# Patient Record
Sex: FEMALE | Race: WHITE | NOT HISPANIC OR LATINO | Employment: OTHER | URBAN - NONMETROPOLITAN AREA
[De-identification: names, ages, dates, MRNs, and addresses within clinical notes are randomized per-mention and may not be internally consistent; named-entity substitution may affect disease eponyms.]

---

## 2017-05-02 ENCOUNTER — TELEPHONE (OUTPATIENT)
Dept: FAMILY MEDICINE | Facility: OTHER | Age: 57
End: 2017-05-02

## 2017-05-02 DIAGNOSIS — Z12.11 ENCOUNTER FOR SCREENING FECAL OCCULT BLOOD TESTING: Primary | ICD-10-CM

## 2017-05-02 NOTE — TELEPHONE ENCOUNTER
5/2/2017    Called the patient for Health Maintenance, agreed to FIT test screening. Order placed for provider to sign.      Please does not wish to complete colonoscopy, however, patient has agreed to FIT test. Please send the FIT test to the patient's home.         Thanks!  Outreach ,  Mignon Lang

## 2017-11-13 DIAGNOSIS — E78.5 HYPERLIPIDEMIA WITH TARGET LDL LESS THAN 130: ICD-10-CM

## 2017-11-14 RX ORDER — SIMVASTATIN 20 MG
TABLET ORAL
Qty: 30 TABLET | Refills: 0 | Status: SHIPPED | OUTPATIENT
Start: 2017-11-14 | End: 2017-11-29

## 2017-11-14 NOTE — TELEPHONE ENCOUNTER
Requested Prescriptions   Pending Prescriptions Disp Refills     simvastatin (ZOCOR) 20 MG tablet [Pharmacy Med Name: SIMVASTATIN 20MG TABS] 90 tablet 3     Sig: TAKE ONE TABLET BY MOUTH AT BEDTIME    Statins Protocol Passed    11/13/2017  2:24 PM       Passed - LDL on file in past 12 months    Recent Labs   Lab Test  11/16/16   0846   LDL  79            Passed - No abnormal creatine kinase in past 12 months    No lab results found.         Passed - Recent or future visit with authorizing provider    Patient had office visit in the last year or has a visit in the next 30 days with authorizing provider.  See chart review.              Passed - Patient is age 18 or older       Passed - No active pregnancy on record       Passed - No positive pregnancy test in past 12 months

## 2017-11-14 NOTE — TELEPHONE ENCOUNTER
Medication is being filled for 1 time refill only due to:  Patient needs to be seen because it has been more than one year since last visit.     Will route to scheduling to call and schedule.     Tiffany Malik RN  [facility]

## 2017-11-29 ENCOUNTER — OFFICE VISIT (OUTPATIENT)
Dept: FAMILY MEDICINE | Facility: OTHER | Age: 57
End: 2017-11-29
Payer: COMMERCIAL

## 2017-11-29 VITALS
DIASTOLIC BLOOD PRESSURE: 80 MMHG | WEIGHT: 169 LBS | SYSTOLIC BLOOD PRESSURE: 130 MMHG | OXYGEN SATURATION: 96 % | HEART RATE: 84 BPM | BODY MASS INDEX: 31.1 KG/M2 | TEMPERATURE: 97.7 F | RESPIRATION RATE: 20 BRPM | HEIGHT: 62 IN

## 2017-11-29 DIAGNOSIS — Z11.59 NEED FOR HEPATITIS C SCREENING TEST: ICD-10-CM

## 2017-11-29 DIAGNOSIS — Z00.00 ROUTINE GENERAL MEDICAL EXAMINATION AT A HEALTH CARE FACILITY: Primary | ICD-10-CM

## 2017-11-29 DIAGNOSIS — Z12.11 SPECIAL SCREENING FOR MALIGNANT NEOPLASMS, COLON: ICD-10-CM

## 2017-11-29 DIAGNOSIS — R79.89 ELEVATED TSH: ICD-10-CM

## 2017-11-29 DIAGNOSIS — Z12.31 ENCOUNTER FOR SCREENING MAMMOGRAM FOR BREAST CANCER: ICD-10-CM

## 2017-11-29 DIAGNOSIS — Z13.1 SCREENING FOR DIABETES MELLITUS: ICD-10-CM

## 2017-11-29 DIAGNOSIS — E78.5 HYPERLIPIDEMIA WITH TARGET LDL LESS THAN 130: ICD-10-CM

## 2017-11-29 LAB
CHOLEST SERPL-MCNC: 182 MG/DL
GLUCOSE SERPL-MCNC: 105 MG/DL (ref 70–99)
HCV AB SERPL QL IA: NONREACTIVE
HDLC SERPL-MCNC: 50 MG/DL
LDLC SERPL CALC-MCNC: 99 MG/DL
NONHDLC SERPL-MCNC: 132 MG/DL
TRIGL SERPL-MCNC: 165 MG/DL
TSH SERPL DL<=0.005 MIU/L-ACNC: 2.98 MU/L (ref 0.4–4)

## 2017-11-29 PROCEDURE — 86803 HEPATITIS C AB TEST: CPT | Performed by: NURSE PRACTITIONER

## 2017-11-29 PROCEDURE — 36415 COLL VENOUS BLD VENIPUNCTURE: CPT | Performed by: NURSE PRACTITIONER

## 2017-11-29 PROCEDURE — 84443 ASSAY THYROID STIM HORMONE: CPT | Performed by: NURSE PRACTITIONER

## 2017-11-29 PROCEDURE — 99396 PREV VISIT EST AGE 40-64: CPT | Performed by: NURSE PRACTITIONER

## 2017-11-29 PROCEDURE — 80061 LIPID PANEL: CPT | Performed by: NURSE PRACTITIONER

## 2017-11-29 PROCEDURE — 82947 ASSAY GLUCOSE BLOOD QUANT: CPT | Performed by: NURSE PRACTITIONER

## 2017-11-29 RX ORDER — SIMVASTATIN 20 MG
TABLET ORAL
Qty: 90 TABLET | Refills: 3 | Status: SHIPPED | OUTPATIENT
Start: 2017-11-29 | End: 2018-12-13

## 2017-11-29 NOTE — NURSING NOTE
"Chief Complaint   Patient presents with     Physical     no pap     Health Maintenance     colon, hep c, mammo, declines flu shot       Initial /80  Pulse 84  Temp 97.7  F (36.5  C) (Tympanic)  Resp 20  Ht 5' 2\" (1.575 m)  Wt 169 lb (76.7 kg)  LMP 04/14/2009  SpO2 96%  Breastfeeding? No  BMI 30.91 kg/m2 Estimated body mass index is 30.91 kg/(m^2) as calculated from the following:    Height as of this encounter: 5' 2\" (1.575 m).    Weight as of this encounter: 169 lb (76.7 kg).  Medication Reconciliation: complete   ................Tha Camacho LPN,   November 29, 2017,      8:20 AM,   [facility]    "

## 2017-11-29 NOTE — MR AVS SNAPSHOT
After Visit Summary   11/29/2017    Sandy Burleson    MRN: [id number]           Patient Information     Date Of Birth          1960        Visit Information        Provider Department      11/29/2017 8:00 AM Mily Dale APRN [facility]        Today's Diagnoses     Routine general medical examination at a health care facility    -  1    Hyperlipidemia with target LDL less than 130        Encounter for screening mammogram for breast cancer        Special screening for malignant neoplasms, colon        Screening for diabetes mellitus        Elevated TSH        Need for hepatitis C screening test          Care Instructions    Labs will be done today.  I will call or send a letter with results within the next 1-2 weeks.      Schedule a mammogram.         Preventive Health Recommendations  Female Ages 50 - 64    Yearly exam: See your health care provider every year in order to  o Review health changes.   o Discuss preventive care.    o Review your medicines if your doctor has prescribed any.      Get a Pap test every three years (unless you have an abnormal result and your provider advises testing more often).    If you get Pap tests with HPV test, you only need to test every 5 years, unless you have an abnormal result.     You do not need a Pap test if your uterus was removed (hysterectomy) and you have not had cancer.    You should be tested each year for STDs (sexually transmitted diseases) if you're at risk.     Have a mammogram every 1 to 2 years.    Have a colonoscopy at age 50, or have a yearly FIT test (stool test). These exams screen for colon cancer.      Have a cholesterol test every 5 years, or more often if advised.    Have a diabetes test (fasting glucose) every three years. If you are at risk for diabetes, you should have this test more often.     If you are at risk for osteoporosis (brittle bone disease), think about having a bone density scan (DEXA).    Shots: Get  "a flu shot each year. Get a tetanus shot every 10 years.    Nutrition:     Eat at least 5 servings of fruits and vegetables each day.    Eat whole-grain bread, whole-wheat pasta and brown rice instead of white grains and rice.    Talk to your provider about Calcium and Vitamin D.     Lifestyle    Exercise at least 150 minutes a week (30 minutes a day, 5 days a week). This will help you control your weight and prevent disease.    Limit alcohol to one drink per day.    No smoking.     Wear sunscreen to prevent skin cancer.     See your dentist every six months for an exam and cleaning.    See your eye doctor every 1 to 2 years.            Follow-ups after your visit        Future tests that were ordered for you today     Open Future Orders        Priority Expected Expires Ordered    *MA Screening Digital Bilateral Routine  11/29/2018 11/29/2017    Fecal colorectal cancer screen (FIT) Routine 12/20/2017 2/21/2018 11/29/2017            Who to contact     If you have questions or need follow up information about today's clinic visit or your schedule please contact [facility] directly at [phone].  Normal or non-critical lab and imaging results will be communicated to you by Green Ahart, letter or phone within 4 business days after the clinic has received the results. If you do not hear from us within 7 days, please contact the clinic through [employer] or phone. If you have a critical or abnormal lab result, we will notify you by phone as soon as possible.  Submit refill requests through Thu[employer] or call your pharmacy and they will forward the refill request to us. Please allow 3 business days for your refill to be completed.          Additional Information About Your Visit        Green Ahart Information     Thucy lets you send messages to your doctor, view your test results, renew your prescriptions, schedule appointments and more. To sign up, go to [email or web address] . Click on \"Log in\" on the left side of " "the screen, which will take you to the Welcome page. Then click on \"Sign up Now\" on the right side of the page.     You will be asked to enter the access code listed below, as well as some personal information. Please follow the directions to create your username and password.     Your access code is: [email or web address]  Expires: 2018  8:34 AM     Your access code will  in 90 days. If you need help or a new code, please call your [facility] clinic or [phone].        Care EveryWhere ID     This is your Care EveryWhere ID. This could be used by other organizations to access your [facility] medical records  [id number]        Your Vitals Were     Pulse Temperature Respirations Height Last Period Pulse Oximetry    84 97.7  F (36.5  C) (Tympanic) 20 5' 2\" (1.575 m) 2009 96%    Breastfeeding? BMI (Body Mass Index)                No 30.91 kg/m2           Blood Pressure from Last 3 Encounters:   17 130/80   16 134/72   16 132/80    Weight from Last 3 Encounters:   17 169 lb (76.7 kg)   16 170 lb 11.2 oz (77.4 kg)   16 173 lb 1.6 oz (78.5 kg)              We Performed the Following     Glucose     Hepatitis C Screen Reflex to HCV RNA Quant and Genotype     Lipid panel reflex to direct LDL Fasting     TSH with free T4 reflex          Today's Medication Changes          These changes are accurate as of: 17  8:34 AM.  If you have any questions, ask your nurse or doctor.               These medicines have changed or have updated prescriptions.        Dose/Directions    simvastatin 20 MG tablet   Commonly known as:  ZOCOR   This may have changed:  See the new instructions.   Used for:  Hyperlipidemia with target LDL less than 130   Changed by:  Mily Dale APRN CNP        TAKE ONE TABLET BY MOUTH AT BEDTIME   Quantity:  90 tablet   Refills:  3            Where to get your medicines      These medications were sent to [facility] [place] - [address]  [address], [place] [address]     Phone:  [phone]     simvastatin 20 MG tablet                Primary Care Provider Office Phone # Fax #    GLADYS Gallo [phone]       [address] [facility] [address]        Equal Access to Services     BURTON GUEVARA : Hadii aad ku hadasho Soomaali, waaxda luqadaha, qaybta kaalmada adeegyada, waxay jeffyin haysusann tushar spencerlubanik norris. So [facility] [phone].    ATENCIÓN: Si habla español, tiene a abrams disposición servicios gratuitos de asistencia lingüística. Elena al [phone].    We comply with applicable federal civil rights laws and [place] laws. We do not discriminate on the basis of race, color, national origin, age, disability, sex, sexual orientation, or gender identity.            Thank you!     Thank you for choosing [facility]  for your care. Our goal is always to provide you with excellent care. Hearing back from our patients is one way we can continue to improve our services. Please take a few minutes to complete the written survey that you may receive in the mail after your visit with us. Thank you!             Your Updated Medication List - Protect others around you: Learn how to safely use, store and throw away your medicines at www.disposemymeds.org.          This list is accurate as of: 11/29/17  8:34 AM.  Always use your most recent med list.                   Brand Name Dispense Instructions for use Diagnosis    EPINEPHrine 0.3 MG/0.3ML injection 2-pack    EPIPEN 2-XIMENA    1 each    Inject 0.3 mLs (0.3 mg) into the muscle once as needed for anaphylaxis    Allergy, insect bite       MULTIVITAMIN TABS   OR      1 tab daily        priLOSEC 20 MG CR capsule   Generic drug:  omeprazole     90    1 TAB PO QD (Once per day)        simvastatin 20 MG tablet    ZOCOR    90 tablet    TAKE ONE TABLET BY MOUTH AT BEDTIME    Hyperlipidemia with target LDL less than 130

## 2017-11-29 NOTE — PROGRESS NOTES
SUBJECTIVE:   CC: Sandy Burleson is an 57 year old woman who presents for preventive health visit.     Physical   Annual:     Getting at least 3 servings of Calcium per day::  Yes    Bi-annual eye exam::  NO    Dental care twice a year::  Yes    Sleep apnea or symptoms of sleep apnea::  None    Diet::  Regular (no restrictions)    Frequency of exercise::  2-3 days/week    Duration of exercise::  15-30 minutes    Taking medications regularly::  Yes    Medication side effects::  None and No muscle aches    Additional concerns today::  No        Today's PHQ-2 Score: PHQ-2 ( 1999 Pfizer) 11/29/2017   Q1: Little interest or pleasure in doing things 0   Q2: Feeling down, depressed or hopeless 0   PHQ-2 Score 0   Q1: Little interest or pleasure in doing things Not at all   Q2: Feeling down, depressed or hopeless Not at all   PHQ-2 Score 0       Abuse: Current or Past(Physical, Sexual or Emotional)- No  Do you feel safe in your environment - Yes    Social History   Substance Use Topics     Smoking status: Never Smoker     Smokeless tobacco: Never Used     Alcohol use No     The patient does not drink >3 drinks per day nor >7 drinks per week.    Reviewed orders with patient.  Reviewed health maintenance and updated orders accordingly - Yes  Labs reviewed in EPIC  BP Readings from Last 3 Encounters:   11/29/17 130/80   11/16/16 134/72   01/27/16 132/80    Wt Readings from Last 3 Encounters:   11/29/17 169 lb (76.7 kg)   11/16/16 170 lb 11.2 oz (77.4 kg)   01/27/16 173 lb 1.6 oz (78.5 kg)                  Patient Active Problem List   Diagnosis     Hyperlipidemia with target LDL less than 130     Advanced directives, counseling/discussion     Past Surgical History:   Procedure Laterality Date     C APPENDECTOMY  age 12     HC LAPAROSCOPY, SURGICAL; CHOLECYSTECTOMY      Cholecystectomy, Laparoscopic     HC REMOVAL OF TONSILS,<13 Y/O  age 6     HYSTERECTOMY, PAP NO LONGER INDICATED       HYSTERECTOMY, VAGINAL  6/1/09        Social History   Substance Use Topics     Smoking status: Never Smoker     Smokeless tobacco: Never Used     Alcohol use No     Family History   Problem Relation Age of Onset     Arthritis Mother      DIABETES Paternal Grandmother      ?     HEART DISEASE Maternal Grandfather       from MI ? at age 76         Current Outpatient Prescriptions   Medication Sig Dispense Refill     simvastatin (ZOCOR) 20 MG tablet TAKE ONE TABLET BY MOUTH AT BEDTIME 90 tablet 3     PRILOSEC 20 MG OR CPDR 1 TAB PO QD (Once per day) 90 3     MULTIVITAMIN TABS   OR 1 tab daily       [DISCONTINUED] simvastatin (ZOCOR) 20 MG tablet TAKE ONE TABLET BY MOUTH AT BEDTIME 30 tablet 0     EPINEPHrine (EPIPEN 2-XIMENA) 0.3 MG/0.3ML injection Inject 0.3 mLs (0.3 mg) into the muscle once as needed for anaphylaxis 1 each 2     Allergies   Allergen Reactions     Bee Venom      swells up/respiratory difficulty     Oxycodone-Acetaminophen      Percocet-Itching     Sulfa Drugs      rash around ankles           Patient over age 50, mutual decision to screen reflected in health maintenance.      Pertinent mammograms are reviewed under the imaging tab.  History of abnormal Pap smear: Status post benign hysterectomy. Health Maintenance and Surgical History updated.    Reviewed and updated as needed this visit by clinical staffTobacco  Allergies  Meds  Soc Hx        Reviewed and updated as needed this visit by Provider        Past Medical History:   Diagnosis Date     Acute gastric ulcer without mention of hemorrhage, perforation, or obstruction 4 years ago    no current sx, had endoscopy, takes prilosec     Anemia     SECONDARY TO MENORRHAGIA      Past Surgical History:   Procedure Laterality Date     C APPENDECTOMY  age 12     HC LAPAROSCOPY, SURGICAL; CHOLECYSTECTOMY      Cholecystectomy, Laparoscopic     HC REMOVAL OF TONSILS,<11 Y/O  age 6     HYSTERECTOMY, PAP NO LONGER INDICATED       HYSTERECTOMY, VAGINAL  09       Review of Systems  C:  "NEGATIVE for fever, chills, change in weight  I: NEGATIVE for worrisome rashes, moles or lesions  E: NEGATIVE for vision changes or irritation  ENT: NEGATIVE for ear, mouth and throat problems  R: NEGATIVE for significant cough or SOB  B: NEGATIVE for masses, tenderness or discharge  CV: NEGATIVE for chest pain, palpitations or peripheral edema  GI: NEGATIVE for nausea, abdominal pain, heartburn, or change in bowel habits  : NEGATIVE for unusual urinary or vaginal symptoms. No vaginal bleeding.  M: NEGATIVE for significant arthralgias or myalgia  N: NEGATIVE for weakness, dizziness or paresthesias  P: NEGATIVE for changes in mood or affect      OBJECTIVE:   /80  Pulse 84  Temp 97.7  F (36.5  C) (Tympanic)  Resp 20  Ht 5' 2\" (1.575 m)  Wt 169 lb (76.7 kg)  LMP 04/14/2009  SpO2 96%  Breastfeeding? No  BMI 30.91 kg/m2  Physical Exam  GENERAL APPEARANCE: alert, no distress and over weight  EYES: Eyes grossly normal to inspection, PERRL and conjunctivae and sclerae normal  HENT: ear canals and TM's normal, nose and mouth without ulcers or lesions, oropharynx clear and oral mucous membranes moist  NECK: no adenopathy, no asymmetry, masses, or scars and thyroid normal to palpation  RESP: lungs clear to auscultation - no rales, rhonchi or wheezes  CV: regular rate and rhythm, normal S1 S2, no S3 or S4, no murmur, click or rub, no peripheral edema and peripheral pulses strong  ABDOMEN: soft, nontender, no hepatosplenomegaly, no masses and bowel sounds normal  MS: no musculoskeletal defects are noted and gait is age appropriate without ataxia  SKIN: no suspicious lesions or rashes  NEURO: Normal strength and tone, sensory exam grossly normal, mentation intact and speech normal  PSYCH: mentation appears normal and affect normal/bright    ASSESSMENT/PLAN:   1. Routine general medical examination at a health care facility    2. Hyperlipidemia with target LDL less than 130  Chronic, controlled.  No change in " "treatment plan.   - simvastatin (ZOCOR) 20 MG tablet; TAKE ONE TABLET BY MOUTH AT BEDTIME  Dispense: 90 tablet; Refill: 3  - Lipid panel reflex to direct LDL Fasting    3. Encounter for screening mammogram for breast cancer  - *MA Screening Digital Bilateral; Future    4. Special screening for malignant neoplasms, colon  - Fecal colorectal cancer screen (FIT); Future    5. Screening for diabetes mellitus  - Glucose    6. Elevated TSH  Elevated last year, will repeat today.  No symptoms.   - TSH with free T4 reflex    7. Need for hepatitis C screening test  - Hepatitis C Screen Reflex to HCV RNA Quant and Genotype    COUNSELING:  Reviewed preventive health counseling, as reflected in patient instructions       Regular exercise       Healthy diet/nutrition       Colon cancer screening       Consider Hep C screening for patients born between 1945 and 1965    BP Screening:   Last 3 BP Readings:    BP Readings from Last 3 Encounters:   11/29/17 130/80   11/16/16 134/72   01/27/16 132/80       The following was recommended to the patient:  Re-screen BP within a year and recommended lifestyle modifications     reports that she has never smoked. She has never used smokeless tobacco.    Estimated body mass index is 30.91 kg/(m^2) as calculated from the following:    Height as of this encounter: 5' 2\" (1.575 m).    Weight as of this encounter: 169 lb (76.7 kg).   Weight management plan: Discussed healthy diet and exercise guidelines and patient will follow up in 12 months in clinic to re-evaluate.    Counseling Resources:  ATP IV Guidelines  Pooled Cohorts Equation Calculator  Breast Cancer Risk Calculator  FRAX Risk Assessment  ICSI Preventive Guidelines  Dietary Guidelines for Americans, 2010  USDA's MyPlate  ASA Prophylaxis  Lung CA Screening    GLADYS Gallo CNP  [facility]"

## 2017-11-29 NOTE — PATIENT INSTRUCTIONS
Labs will be done today.  I will call or send a letter with results within the next 1-2 weeks.      Schedule a mammogram.         Preventive Health Recommendations  Female Ages 50 - 64    Yearly exam: See your health care provider every year in order to  o Review health changes.   o Discuss preventive care.    o Review your medicines if your doctor has prescribed any.      Get a Pap test every three years (unless you have an abnormal result and your provider advises testing more often).    If you get Pap tests with HPV test, you only need to test every 5 years, unless you have an abnormal result.     You do not need a Pap test if your uterus was removed (hysterectomy) and you have not had cancer.    You should be tested each year for STDs (sexually transmitted diseases) if you're at risk.     Have a mammogram every 1 to 2 years.    Have a colonoscopy at age 50, or have a yearly FIT test (stool test). These exams screen for colon cancer.      Have a cholesterol test every 5 years, or more often if advised.    Have a diabetes test (fasting glucose) every three years. If you are at risk for diabetes, you should have this test more often.     If you are at risk for osteoporosis (brittle bone disease), think about having a bone density scan (DEXA).    Shots: Get a flu shot each year. Get a tetanus shot every 10 years.    Nutrition:     Eat at least 5 servings of fruits and vegetables each day.    Eat whole-grain bread, whole-wheat pasta and brown rice instead of white grains and rice.    Talk to your provider about Calcium and Vitamin D.     Lifestyle    Exercise at least 150 minutes a week (30 minutes a day, 5 days a week). This will help you control your weight and prevent disease.    Limit alcohol to one drink per day.    No smoking.     Wear sunscreen to prevent skin cancer.     See your dentist every six months for an exam and cleaning.    See your eye doctor every 1 to 2 years.

## 2017-11-30 NOTE — PROGRESS NOTES
Letter sent to patient informing of lab results/recommendations.  ................Tha Camacho LPN,   November 30, 2017,      10:33 AM,   [facility]

## 2017-12-05 ENCOUNTER — RADIANT APPOINTMENT (OUTPATIENT)
Dept: MAMMOGRAPHY | Facility: OTHER | Age: 57
End: 2017-12-05
Attending: NURSE PRACTITIONER
Payer: COMMERCIAL

## 2017-12-05 DIAGNOSIS — Z12.31 ENCOUNTER FOR SCREENING MAMMOGRAM FOR BREAST CANCER: ICD-10-CM

## 2017-12-05 PROCEDURE — G0202 SCR MAMMO BI INCL CAD: HCPCS | Mod: TC

## 2018-09-25 DIAGNOSIS — E78.5 HYPERLIPIDEMIA WITH TARGET LDL LESS THAN 130: ICD-10-CM

## 2018-09-25 NOTE — TELEPHONE ENCOUNTER
"Requested Prescriptions   Pending Prescriptions Disp Refills     simvastatin (ZOCOR) 20 MG tablet [Pharmacy Med Name: SIMVASTATIN 20MG TABS] 90 tablet 3    Last Written Prescription Date:  11/29/17  Last Fill Quantity: 90,  # refills: 3   Last office visit: 11/29/2017 with prescribing provider:  11/29/17   Future Office Visit:     Sig: TAKE ONE TABLET BY MOUTH AT BEDTIME    Statins Protocol Passed    9/25/2018  2:50 PM       Passed - LDL on file in past 12 months    Recent Labs   Lab Test  11/29/17   0834   LDL  99            Passed - No abnormal creatine kinase in past 12 months    No lab results found.            Passed - Recent (12 mo) or future (30 days) visit within the authorizing provider's specialty    Patient had office visit in the last 12 months or has a visit in the next 30 days with authorizing provider or within the authorizing provider's specialty.  See \"Patient Info\" tab in inbasket, or \"Choose Columns\" in Meds & Orders section of the refill encounter.           Passed - Patient is age 18 or older       Passed - No active pregnancy on record       Passed - No positive pregnancy test in past 12 months          "

## 2018-09-26 RX ORDER — SIMVASTATIN 20 MG
TABLET ORAL
Qty: 90 TABLET | Refills: 3 | OUTPATIENT
Start: 2018-09-26

## 2018-09-26 NOTE — TELEPHONE ENCOUNTER
Prescription was sent 11/29/17 for #90 with 3 refills.  Pharmacy notified via E-Prescribe refusal.     Tiffany Malik RN  [facility]

## 2018-12-13 DIAGNOSIS — E78.5 HYPERLIPIDEMIA WITH TARGET LDL LESS THAN 130: ICD-10-CM

## 2018-12-14 RX ORDER — SIMVASTATIN 20 MG
TABLET ORAL
Qty: 30 TABLET | Refills: 0 | Status: SHIPPED | OUTPATIENT
Start: 2018-12-14 | End: 2019-01-29

## 2018-12-14 NOTE — TELEPHONE ENCOUNTER
"Stephanie refill given per RN protocol.   Please contact patient to have them schedule the following: due for annual exam and labs after 11/29/2018    Catherine Lucas RN, BSN      zocor  Last Written Prescription Date:  11/29/2017  Last Fill Quantity: 90,  # refills: 3   Last office visit: 11/29/2017 with prescribing provider:  11/29/2017   Future Office Visit:      Requested Prescriptions   Pending Prescriptions Disp Refills     simvastatin (ZOCOR) 20 MG tablet [Pharmacy Med Name: SIMVASTATIN 20MG TABS] 90 tablet 3     Sig: TAKE ONE TABLET BY MOUTH AT BEDTIME    Statins Protocol Failed - 12/13/2018  1:06 AM       Failed - LDL on file in past 12 months    Recent Labs   Lab Test 11/29/17  0834   LDL 99            Failed - Recent (12 mo) or future (30 days) visit within the authorizing provider's specialty    Patient had office visit in the last 12 months or has a visit in the next 30 days with authorizing provider or within the authorizing provider's specialty.  See \"Patient Info\" tab in inbasket, or \"Choose Columns\" in Meds & Orders section of the refill encounter.             Passed - No abnormal creatine kinase in past 12 months    No lab results found.            Passed - Patient is age 18 or older       Passed - No active pregnancy on record       Passed - No positive pregnancy test in past 12 months        Catherine Lucas RN on 12/14/2018 at 10:46 AM    "

## 2019-01-29 DIAGNOSIS — E78.5 HYPERLIPIDEMIA WITH TARGET LDL LESS THAN 130: ICD-10-CM

## 2019-01-29 NOTE — TELEPHONE ENCOUNTER
"Routing to PCP for refill if appropriate.   Routing to schedulers for yearly appointment with PCP.     Zocor  Last Written Prescription Date:  12/14/2018  Last Fill Quantity: 30,  # refills: 0   Last office visit: 11/29/2017 with prescribing provider:  Suyapa.    Future Office Visit:  None  Routing refill request to provider for review/approval because:  Labs not current:  LDL    Requested Prescriptions   Pending Prescriptions Disp Refills     simvastatin (ZOCOR) 20 MG tablet [Pharmacy Med Name: SIMVASTATIN 20MG TABS] 30 tablet 0     Sig: TAKE ONE TABLET BY MOUTH AT BEDTIME    Statins Protocol Failed - 1/29/2019  1:05 AM       Failed - LDL on file in past 12 months    Recent Labs   Lab Test 11/29/17  0834   LDL 99          Failed - Recent (12 mo) or future (30 days) visit within the authorizing provider's specialty    Patient had office visit in the last 12 months or has a visit in the next 30 days with authorizing provider or within the authorizing provider's specialty.  See \"Patient Info\" tab in inbasket, or \"Choose Columns\" in Meds & Orders section of the refill encounter.         Passed - No abnormal creatine kinase in past 12 months    No lab results found.        Passed - Medication is active on med list       Passed - Patient is age 18 or older       Passed - No active pregnancy on record       Passed - No positive pregnancy test in past 12 months      Shara Davila RN   "

## 2019-01-30 RX ORDER — SIMVASTATIN 20 MG
TABLET ORAL
Qty: 30 TABLET | Refills: 0 | Status: SHIPPED | OUTPATIENT
Start: 2019-01-30 | End: 2019-02-04

## 2019-02-04 ENCOUNTER — OFFICE VISIT (OUTPATIENT)
Dept: FAMILY MEDICINE | Facility: OTHER | Age: 59
End: 2019-02-04
Payer: COMMERCIAL

## 2019-02-04 VITALS
HEIGHT: 62 IN | OXYGEN SATURATION: 95 % | BODY MASS INDEX: 30.91 KG/M2 | RESPIRATION RATE: 20 BRPM | DIASTOLIC BLOOD PRESSURE: 78 MMHG | SYSTOLIC BLOOD PRESSURE: 134 MMHG | HEART RATE: 80 BPM | WEIGHT: 168 LBS | TEMPERATURE: 98.1 F

## 2019-02-04 DIAGNOSIS — Z00.00 ROUTINE HISTORY AND PHYSICAL EXAMINATION OF ADULT: Primary | ICD-10-CM

## 2019-02-04 DIAGNOSIS — R00.2 PALPITATIONS: ICD-10-CM

## 2019-02-04 DIAGNOSIS — Z12.31 ENCOUNTER FOR SCREENING MAMMOGRAM FOR BREAST CANCER: ICD-10-CM

## 2019-02-04 DIAGNOSIS — Z91.030 BEE STING ALLERGY: ICD-10-CM

## 2019-02-04 DIAGNOSIS — Z13.1 SCREENING FOR DIABETES MELLITUS: ICD-10-CM

## 2019-02-04 DIAGNOSIS — E78.5 HYPERLIPIDEMIA WITH TARGET LDL LESS THAN 130: ICD-10-CM

## 2019-02-04 LAB
CHOLEST SERPL-MCNC: 167 MG/DL
ERYTHROCYTE [DISTWIDTH] IN BLOOD BY AUTOMATED COUNT: 13.2 % (ref 10–15)
GLUCOSE SERPL-MCNC: 88 MG/DL (ref 70–99)
HCT VFR BLD AUTO: 40.9 % (ref 35–47)
HDLC SERPL-MCNC: 50 MG/DL
HGB BLD-MCNC: 13.4 G/DL (ref 11.7–15.7)
LDLC SERPL CALC-MCNC: 83 MG/DL
MCH RBC QN AUTO: 29.5 PG (ref 26.5–33)
MCHC RBC AUTO-ENTMCNC: 32.8 G/DL (ref 31.5–36.5)
MCV RBC AUTO: 90 FL (ref 78–100)
NONHDLC SERPL-MCNC: 117 MG/DL
PLATELET # BLD AUTO: 378 10E9/L (ref 150–450)
RBC # BLD AUTO: 4.54 10E12/L (ref 3.8–5.2)
T4 FREE SERPL-MCNC: 0.81 NG/DL (ref 0.76–1.46)
TRIGL SERPL-MCNC: 172 MG/DL
TSH SERPL DL<=0.005 MIU/L-ACNC: 5.5 MU/L (ref 0.4–4)
WBC # BLD AUTO: 8.1 10E9/L (ref 4–11)

## 2019-02-04 PROCEDURE — 99396 PREV VISIT EST AGE 40-64: CPT | Performed by: NURSE PRACTITIONER

## 2019-02-04 PROCEDURE — 85027 COMPLETE CBC AUTOMATED: CPT | Performed by: NURSE PRACTITIONER

## 2019-02-04 PROCEDURE — 84439 ASSAY OF FREE THYROXINE: CPT | Performed by: NURSE PRACTITIONER

## 2019-02-04 PROCEDURE — 36415 COLL VENOUS BLD VENIPUNCTURE: CPT | Performed by: NURSE PRACTITIONER

## 2019-02-04 PROCEDURE — 80061 LIPID PANEL: CPT | Performed by: NURSE PRACTITIONER

## 2019-02-04 PROCEDURE — 82947 ASSAY GLUCOSE BLOOD QUANT: CPT | Performed by: NURSE PRACTITIONER

## 2019-02-04 PROCEDURE — 84443 ASSAY THYROID STIM HORMONE: CPT | Performed by: NURSE PRACTITIONER

## 2019-02-04 RX ORDER — EPINEPHRINE 0.3 MG/.3ML
0.3 INJECTION SUBCUTANEOUS PRN
Qty: 0.3 ML | Refills: 1 | Status: SHIPPED | OUTPATIENT
Start: 2019-02-04 | End: 2020-06-16

## 2019-02-04 RX ORDER — SIMVASTATIN 20 MG
20 TABLET ORAL AT BEDTIME
Qty: 90 TABLET | Refills: 3 | Status: SHIPPED | OUTPATIENT
Start: 2019-02-04 | End: 2020-03-11

## 2019-02-04 ASSESSMENT — ENCOUNTER SYMPTOMS
BREAST MASS: 0
JOINT SWELLING: 0
PALPITATIONS: 0
HEADACHES: 1
ARTHRALGIAS: 0
SHORTNESS OF BREATH: 0
HEMATURIA: 0
HEARTBURN: 1
COUGH: 0
PARESTHESIAS: 0
FREQUENCY: 0
EYE PAIN: 0
HEMATOCHEZIA: 0
DIARRHEA: 0
DYSURIA: 0
WEAKNESS: 0
CHILLS: 0
FEVER: 0
ABDOMINAL PAIN: 0
MYALGIAS: 0
NERVOUS/ANXIOUS: 0
NAUSEA: 0
DIZZINESS: 0
SORE THROAT: 0

## 2019-02-04 ASSESSMENT — MIFFLIN-ST. JEOR: SCORE: 1287.35

## 2019-02-04 ASSESSMENT — PAIN SCALES - GENERAL: PAINLEVEL: NO PAIN (0)

## 2019-02-04 NOTE — LETTER
February 5, 2019      Sandy Burleson  [address]  [address]   [facility] [address]        Dear ,    We are writing to inform you of your test results.    Your test results fall within the expected range(s) or remain unchanged from previous results.  Please continue with current treatment plan.    Resulted Orders   Lipid panel reflex to direct LDL Fasting   Result Value Ref Range    Cholesterol 167 <200 mg/dL    Triglycerides 172 (H) <150 mg/dL      Comment:      Borderline high:  150-199 mg/dl  High:             200-499 mg/dl  Very high:       >499 mg/dl  Fasting specimen      HDL Cholesterol 50 >49 mg/dL    LDL Cholesterol Calculated 83 <100 mg/dL      Comment:      Desirable:       <100 mg/dl    Non HDL Cholesterol 117 <130 mg/dL   Glucose   Result Value Ref Range    Glucose 88 70 - 99 mg/dL      Comment:      Fasting specimen   TSH with free T4 reflex   Result Value Ref Range    TSH 5.50 (H) 0.40 - 4.00 mU/L   CBC with platelets   Result Value Ref Range    WBC 8.1 4.0 - 11.0 10e9/L    RBC Count 4.54 3.8 - 5.2 10e12/L    Hemoglobin 13.4 11.7 - 15.7 g/dL    Hematocrit 40.9 35.0 - 47.0 %    MCV 90 78 - 100 fl    MCH 29.5 26.5 - 33.0 pg    MCHC 32.8 31.5 - 36.5 g/dL    RDW 13.2 10.0 - 15.0 %    Platelet Count 378 150 - 450 10e9/L   T4 free   Result Value Ref Range    T4 Free 0.81 0.76 - 1.46 ng/dL       If you have any questions or concerns, please call the clinic at the number listed above.       Sincerely,        GLADYS Gallo CNP

## 2019-02-04 NOTE — PROGRESS NOTES
SUBJECTIVE:   CC: Sandy Burleson is an 58 year old woman who presents for preventive health visit.     Physical   Annual:     Getting at least 3 servings of Calcium per day:  Yes    Bi-annual eye exam:  NO    Dental care twice a year:  Yes    Sleep apnea or symptoms of sleep apnea:  None    Diet:  Regular (no restrictions)    Frequency of exercise:  2-3 days/week    Duration of exercise:  15-30 minutes    Taking medications regularly:  Yes    Medication side effects:  None    PHQ-2 Total Score: 0      Hyperlipidemia Follow-Up      Rate your low fat/cholesterol diet?: good    Taking statin?  No    Other lipid medications/supplements?:  none      Today's PHQ-2 Score:   PHQ-2 ( 1999 [employer]) 2/4/2019   Q1: Little interest or pleasure in doing things 0   Q2: Feeling down, depressed or hopeless 0   PHQ-2 Score 0   Q1: Little interest or pleasure in doing things Not at all   Q2: Feeling down, depressed or hopeless Not at all   PHQ-2 Score 0       Abuse: Current or Past(Physical, Sexual or Emotional)- No  Do you feel safe in your environment? Yes    Social History     Tobacco Use     Smoking status: Never Smoker     Smokeless tobacco: Never Used   Substance Use Topics     Alcohol use: No     Alcohol Use 2/4/2019   If you drink alcohol do you typically have greater than 3 drinks per day OR greater than 7 drinks per week? Not Applicable   No flowsheet data found.    Reviewed orders with patient.  Reviewed health maintenance and updated orders accordingly - Yes  Labs reviewed in EPIC  BP Readings from Last 3 Encounters:   02/04/19 134/78   11/29/17 130/80   11/16/16 134/72    Wt Readings from Last 3 Encounters:   02/04/19 76.2 kg (168 lb)   11/29/17 76.7 kg (169 lb)   11/16/16 77.4 kg (170 lb 11.2 oz)                  Patient Active Problem List   Diagnosis     Hyperlipidemia with target LDL less than 130     Advanced directives, counseling/discussion     Past Surgical History:   Procedure Laterality Date     C APPENDECTOMY   age 12     HC LAPAROSCOPY, SURGICAL; CHOLECYSTECTOMY      Cholecystectomy, Laparoscopic     HC REMOVAL OF TONSILS,<11 Y/O  age 6     HYSTERECTOMY, PAP NO LONGER INDICATED       HYSTERECTOMY, VAGINAL  09       Social History     Tobacco Use     Smoking status: Never Smoker     Smokeless tobacco: Never Used   Substance Use Topics     Alcohol use: No     Family History   Problem Relation Age of Onset     Arthritis Mother      Diabetes Paternal Grandmother         ?     Heart Disease Maternal Grandfather          from MI ? at age 76         Current Outpatient Medications   Medication Sig Dispense Refill     EPINEPHrine (EPIPEN/ADRENACLICK/OR ANY BX GENERIC EQUIV) 0.3 MG/0.3ML injection 2-pack Inject 0.3 mLs (0.3 mg) into the muscle as needed for anaphylaxis 0.3 mL 1     MULTIVITAMIN TABS   OR 1 tab daily       PRILOSEC 20 MG OR CPDR 1 TAB PO QD (Once per day) 90 3     simvastatin (ZOCOR) 20 MG tablet Take 1 tablet (20 mg) by mouth At Bedtime 90 tablet 3     EPINEPHrine (EPIPEN 2-XIMENA) 0.3 MG/0.3ML injection Inject 0.3 mLs (0.3 mg) into the muscle once as needed for anaphylaxis 1 each 2     Allergies   Allergen Reactions     Bee Venom      swells up/respiratory difficulty     Oxycodone-Acetaminophen      Percocet-Itching     Sulfa Drugs      rash around ankles       Mammogram Screening: Patient over age 50, mutual decision to screen reflected in health maintenance.    Pertinent mammograms are reviewed under the imaging tab.  History of abnormal Pap smear: Status post benign hysterectomy. Health Maintenance and Surgical History updated.  PAP / HPV 4/3/2009 3/5/2008 2006   PAP NIL NIL NIL     Reviewed and updated as needed this visit by clinical staff  Tobacco  Allergies  Meds         Reviewed and updated as needed this visit by Provider        Past Medical History:   Diagnosis Date     Acute gastric ulcer without mention of hemorrhage, perforation, or obstruction 4 years ago    no current sx, had endoscopy,  "takes prilosec     Anemia     SECONDARY TO MENORRHAGIA      Past Surgical History:   Procedure Laterality Date     C APPENDECTOMY  age 12     HC LAPAROSCOPY, SURGICAL; CHOLECYSTECTOMY      Cholecystectomy, Laparoscopic     HC REMOVAL OF TONSILS,<13 Y/O  age 6     HYSTERECTOMY, PAP NO LONGER INDICATED       HYSTERECTOMY, VAGINAL  6/1/09       Review of Systems   Constitutional: Negative for chills and fever.   HENT: Negative for congestion, ear pain, hearing loss and sore throat.    Eyes: Positive for visual disturbance. Negative for pain.   Respiratory: Negative for cough and shortness of breath.    Cardiovascular: Negative for chest pain, palpitations and peripheral edema.   Gastrointestinal: Positive for heartburn. Negative for abdominal pain, diarrhea, hematochezia and nausea.   Breasts:  Negative for tenderness, breast mass and discharge.   Genitourinary: Negative for dysuria, frequency, genital sores, hematuria, pelvic pain, urgency, vaginal bleeding and vaginal discharge.   Musculoskeletal: Negative for arthralgias, joint swelling and myalgias.   Skin: Negative for rash.   Neurological: Positive for headaches. Negative for dizziness, weakness and paresthesias.   Psychiatric/Behavioral: Negative for mood changes. The patient is not nervous/anxious.      She has been having some intermittent palpitations.  They are occurring very rarely, maybe once a week.  Never with activity.  No chest pain, dyspnea or other symptoms.      OBJECTIVE:   /78   Pulse 80   Temp 98.1  F (36.7  C) (Temporal)   Resp 20   Ht 1.562 m (5' 1.5\")   Wt 76.2 kg (168 lb)   LMP 04/14/2009   SpO2 95%   Breastfeeding? No   BMI 31.23 kg/m    Physical Exam  GENERAL: healthy, alert and no distress  EYES: Eyes grossly normal to inspection, PERRL and conjunctivae and sclerae normal  HENT: ear canals and TM's normal, nose and mouth without ulcers or lesions  NECK: no adenopathy, no asymmetry, masses, or scars and thyroid normal to " "palpation  RESP: lungs clear to auscultation - no rales, rhonchi or wheezes  CV: regular rate and rhythm, normal S1 S2, no S3 or S4, no murmur, click or rub, no peripheral edema and peripheral pulses strong  ABDOMEN: soft, nontender, no hepatosplenomegaly, no masses and bowel sounds normal  MS: no gross musculoskeletal defects noted, no edema  SKIN: no suspicious lesions or rashes  NEURO: Normal strength and tone, mentation intact and speech normal  PSYCH: mentation appears normal, affect normal/bright    Diagnostic Test Results:  Pending     ASSESSMENT/PLAN:   1. Routine history and physical examination of adult    2. Hyperlipidemia with target LDL less than 130  Chronic, controlled.  No change in treatment plan.   - Lipid panel reflex to direct LDL Fasting  - simvastatin (ZOCOR) 20 MG tablet; Take 1 tablet (20 mg) by mouth At Bedtime  Dispense: 90 tablet; Refill: 3    3. Encounter for screening mammogram for breast cancer  - *MA Screening Digital Bilateral; Future    4. Palpitations  Will check some labs  - TSH with free T4 reflex  - CBC with platelets    5. Screening for diabetes mellitus  - Glucose    6. Bee sting allergy  - EPINEPHrine (EPIPEN/ADRENACLICK/OR ANY BX GENERIC EQUIV) 0.3 MG/0.3ML injection 2-pack; Inject 0.3 mLs (0.3 mg) into the muscle as needed for anaphylaxis  Dispense: 0.3 mL; Refill: 1    COUNSELING:  Reviewed preventive health counseling, as reflected in patient instructions    BP Readings from Last 1 Encounters:   02/04/19 134/78     Estimated body mass index is 31.23 kg/m  as calculated from the following:    Height as of this encounter: 1.562 m (5' 1.5\").    Weight as of this encounter: 76.2 kg (168 lb).      Weight management plan: Discussed healthy diet and exercise guidelines     reports that  has never smoked. she has never used smokeless tobacco.      Counseling Resources:  ATP IV Guidelines  Pooled Cohorts Equation Calculator  Breast Cancer Risk Calculator  FRAX Risk Assessment  ICSI " Preventive Guidelines  Dietary Guidelines for Americans, 2010  USDA's MyPlate  ASA Prophylaxis  Lung CA Screening    Mily Dale, GLADYS [facility]

## 2019-02-04 NOTE — PATIENT INSTRUCTIONS
Labs will be done today.   For normal results, you will receive a letter with the results in about 2 weeks.  If anything is abnormal or unexpected, someone from the clinic will call you.        Preventive Health Recommendations  Female Ages 50 - 64    Yearly exam: See your health care provider every year in order to  o Review health changes.   o Discuss preventive care.    o Review your medicines if your doctor has prescribed any.      Get a Pap test every three years (unless you have an abnormal result and your provider advises testing more often).    If you get Pap tests with HPV test, you only need to test every 5 years, unless you have an abnormal result.     You do not need a Pap test if your uterus was removed (hysterectomy) and you have not had cancer.    You should be tested each year for STDs (sexually transmitted diseases) if you're at risk.     Have a mammogram every 1 to 2 years.    Have a colonoscopy at age 50, or have a yearly FIT test (stool test). These exams screen for colon cancer.      Have a cholesterol test every 5 years, or more often if advised.    Have a diabetes test (fasting glucose) every three years. If you are at risk for diabetes, you should have this test more often.     If you are at risk for osteoporosis (brittle bone disease), think about having a bone density scan (DEXA).    Shots: Get a flu shot each year. Get a tetanus shot every 10 years.    Nutrition:     Eat at least 5 servings of fruits and vegetables each day.    Eat whole-grain bread, whole-wheat pasta and brown rice instead of white grains and rice.    Get adequate Calcium and Vitamin D.     Lifestyle    Exercise at least 150 minutes a week (30 minutes a day, 5 days a week). This will help you control your weight and prevent disease.    Limit alcohol to one drink per day.    No smoking.     Wear sunscreen to prevent skin cancer.     See your dentist every six months for an exam and cleaning.    See your eye doctor every 1  to 2 years.

## 2019-02-12 DIAGNOSIS — Z12.31 ENCOUNTER FOR SCREENING MAMMOGRAM FOR BREAST CANCER: ICD-10-CM

## 2019-02-12 PROCEDURE — 77067 SCR MAMMO BI INCL CAD: CPT | Mod: TC

## 2019-03-08 DIAGNOSIS — E78.5 HYPERLIPIDEMIA WITH TARGET LDL LESS THAN 130: ICD-10-CM

## 2019-03-08 RX ORDER — SIMVASTATIN 20 MG
TABLET ORAL
Qty: 30 TABLET | Refills: 0 | OUTPATIENT
Start: 2019-03-08

## 2019-03-08 NOTE — TELEPHONE ENCOUNTER
Prescription was sent 2/4/19 for #90 with 3 refills.  Pharmacy notified via E-Prescribe refusal.     Tiffany Malik RN  [facility]

## 2019-03-08 NOTE — TELEPHONE ENCOUNTER
"Requested Prescriptions   Pending Prescriptions Disp Refills     simvastatin (ZOCOR) 20 MG tablet [Pharmacy Med Name: SIMVASTATIN 20MG TABS]  Last Written Prescription Date:  2/4/19  Last Fill Quantity: 90,  # refills: 3   Last office visit: 2/4/2019 with prescribing provider:  Suyapa   Future Office Visit:     30 tablet 0     Sig: TAKE ONE TABLET BY MOUTH AT BEDTIME    Statins Protocol Passed - 3/8/2019  1:04 AM       Passed - LDL on file in past 12 months    Recent Labs   Lab Test 02/04/19  1451   LDL 83            Passed - No abnormal creatine kinase in past 12 months    No lab results found.            Passed - Recent (12 mo) or future (30 days) visit within the authorizing provider's specialty    Patient had office visit in the last 12 months or has a visit in the next 30 days with authorizing provider or within the authorizing provider's specialty.  See \"Patient Info\" tab in inbasket, or \"Choose Columns\" in Meds & Orders section of the refill encounter.             Passed - Medication is active on med list       Passed - Patient is age 18 or older       Passed - No active pregnancy on record       Passed - No positive pregnancy test in past 12 months          "

## 2019-03-09 DIAGNOSIS — E78.5 HYPERLIPIDEMIA WITH TARGET LDL LESS THAN 130: ICD-10-CM

## 2019-03-11 RX ORDER — SIMVASTATIN 20 MG
TABLET ORAL
Qty: 30 TABLET | Refills: 0 | OUTPATIENT
Start: 2019-03-11

## 2019-03-11 NOTE — TELEPHONE ENCOUNTER
"Requested Prescriptions   Pending Prescriptions Disp Refills     simvastatin (ZOCOR) 20 MG tablet [Pharmacy Med Name: SIMVASTATIN 20MG TABS] 30 tablet 0    Last Written Prescription Date:  2/4/19  Last Fill Quantity: 90,  # refills: 3   Last office visit: 2/4/2019 with prescribing provider:  2/4/19   Future Office Visit:     Sig: TAKE ONE TABLET BY MOUTH AT BEDTIME    Statins Protocol Passed - 3/9/2019  1:06 AM       Passed - LDL on file in past 12 months    Recent Labs   Lab Test 02/04/19  1451   LDL 83            Passed - No abnormal creatine kinase in past 12 months    No lab results found.            Passed - Recent (12 mo) or future (30 days) visit within the authorizing provider's specialty    Patient had office visit in the last 12 months or has a visit in the next 30 days with authorizing provider or within the authorizing provider's specialty.  See \"Patient Info\" tab in inbasket, or \"Choose Columns\" in Meds & Orders section of the refill encounter.             Passed - Medication is active on med list       Passed - Patient is age 18 or older       Passed - No active pregnancy on record       Passed - No positive pregnancy test in past 12 months        "

## 2019-03-11 NOTE — TELEPHONE ENCOUNTER
Prescription was sent 2/4/19 for #90 with 3 refills.  Pharmacy notified via E-Prescribe refusal.     Tiffany Malik RN  [facility]

## 2019-09-28 ENCOUNTER — HEALTH MAINTENANCE LETTER (OUTPATIENT)
Age: 59
End: 2019-09-28

## 2020-03-10 DIAGNOSIS — Z12.31 VISIT FOR SCREENING MAMMOGRAM: ICD-10-CM

## 2020-03-10 PROCEDURE — 77063 BREAST TOMOSYNTHESIS BI: CPT | Mod: TC

## 2020-03-10 PROCEDURE — 77067 SCR MAMMO BI INCL CAD: CPT | Mod: TC

## 2020-03-11 DIAGNOSIS — E78.5 HYPERLIPIDEMIA WITH TARGET LDL LESS THAN 130: ICD-10-CM

## 2020-03-11 RX ORDER — SIMVASTATIN 20 MG
TABLET ORAL
Qty: 30 TABLET | Refills: 0 | Status: SHIPPED | OUTPATIENT
Start: 2020-03-11 | End: 2020-04-13

## 2020-03-11 NOTE — TELEPHONE ENCOUNTER
Medication is being filled for 1 time refill only due to:  Patient needs to be seen because it has been more than one year since last visit.     Will route to scheduling to call and schedule.     LINH BlumN, RN  [facility]

## 2020-03-11 NOTE — TELEPHONE ENCOUNTER
"Requested Prescriptions   Pending Prescriptions Disp Refills     simvastatin (ZOCOR) 20 MG tablet [Pharmacy Med Name: SIMVASTATIN 20MG TABS] 90 tablet 3     Sig: TAKE ONE TABLET BY MOUTH AT BEDTIME   Last Written Prescription Date:  2/4/19  Last Fill Quantity: 90,  # refills: 3   Last office visit: 2/4/2019 with prescribing provider:  2/4/19   Future Office Visit:                Failed - LDL on file in past 12 months     Recent Labs   Lab Test 02/04/19  1451   LDL 83             Failed - Recent (12 mo) or future (30 days) visit within the authorizing provider's specialty     Patient has had an office visit with the authorizing provider or a provider within the authorizing providers department within the previous 12 mos or has a future within next 30 days. See \"Patient Info\" tab in inbasket, or \"Choose Columns\" in Meds & Orders section of the refill encounter.              Passed - No abnormal creatine kinase in past 12 months     No lab results found.             Passed - Medication is active on med list        Passed - Patient is age 18 or older        Passed - No active pregnancy on record        Passed - No positive pregnancy test in past 12 months           "

## 2020-03-11 NOTE — TELEPHONE ENCOUNTER
Reached out to patient, relayed message, appt scheduled    Renetta García ~ Patient Representative  [address] [address]  [email or web address]  [email or web address]  Office:  [phone]  Fax:  [phone]

## 2020-03-15 ENCOUNTER — HEALTH MAINTENANCE LETTER (OUTPATIENT)
Age: 60
End: 2020-03-15

## 2020-03-16 DIAGNOSIS — E78.5 HYPERLIPIDEMIA WITH TARGET LDL LESS THAN 130: ICD-10-CM

## 2020-03-17 RX ORDER — SIMVASTATIN 20 MG
20 TABLET ORAL
Qty: 90 TABLET | Refills: 0 | OUTPATIENT
Start: 2020-03-17

## 2020-03-17 NOTE — TELEPHONE ENCOUNTER
Prescription was sent 3/11/20 for #30 with 0 refills.  Pharmacy notified via E-Prescribe refusal.     Elizabeth Dan RN on 3/17/2020 at 11:47 AM

## 2020-03-17 NOTE — TELEPHONE ENCOUNTER
"Requested Prescriptions   Pending Prescriptions Disp Refills     simvastatin (ZOCOR) 20 MG tablet 90 tablet 0     Sig: Take 1 tablet (20 mg) by mouth   Last Written Prescription Date:  3/11/20  Last Fill Quantity: 30,  # refills: 0   Last office visit: 2/4/19 Suyapa  Future Office Visit:        Statins Protocol Failed - 3/16/2020  8:31 AM        Failed - LDL on file in past 12 months     Recent Labs   Lab Test 02/04/19  1451   LDL 83             Passed - No abnormal creatine kinase in past 12 months     No lab results found.             Passed - Recent (12 mo) or future (30 days) visit within the authorizing provider's specialty     Patient has had an office visit with the authorizing provider or a provider within the authorizing providers department within the previous 12 mos or has a future within next 30 days. See \"Patient Info\" tab in inbasket, or \"Choose Columns\" in Meds & Orders section of the refill encounter.              Passed - Medication is active on med list        Passed - Patient is age 18 or older        Passed - No active pregnancy on record        Passed - No positive pregnancy test in past 12 months             "

## 2020-04-11 DIAGNOSIS — E78.5 HYPERLIPIDEMIA WITH TARGET LDL LESS THAN 130: ICD-10-CM

## 2020-04-13 RX ORDER — SIMVASTATIN 20 MG
TABLET ORAL
Qty: 30 TABLET | Refills: 0 | Status: SHIPPED | OUTPATIENT
Start: 2020-04-13 | End: 2020-05-14

## 2020-04-13 NOTE — TELEPHONE ENCOUNTER
"Simvastatin  Last Written Prescription Date:  03/11/2020  Last Fill Quantity: 30,  # refills: 0   Last office visit: 02/04/2019 with prescribing provider:  Suyapa   Future Office Visit:  None  Routing refill request to provider for review/approval because:  Labs not current:  LDL   Patient needs to be seen because it has been more than 1 year since last office visit.    Requested Prescriptions   Pending Prescriptions Disp Refills     simvastatin (ZOCOR) 20 MG tablet [Pharmacy Med Name: SIMVASTATIN 20MG TABS] 30 tablet 0     Sig: TAKE ONE TABLET BY MOUTH AT BEDTIME       Statins Protocol Failed - 4/11/2020  1:09 AM        Failed - LDL on file in past 12 months     Recent Labs   Lab Test 02/04/19  1451   LDL 83           Failed - Recent (12 mo) or future (30 days) visit within the authorizing provider's specialty     Patient has had an office visit with the authorizing provider or a provider within the authorizing providers department within the previous 12 mos or has a future within next 30 days. See \"Patient Info\" tab in inbasket, or \"Choose Columns\" in Meds & Orders section of the refill encounter.          Passed - No abnormal creatine kinase in past 12 months     No lab results found.         Passed - Medication is active on med list        Passed - Patient is age 18 or older        Passed - No active pregnancy on record        Passed - No positive pregnancy test in past 12 months         Shara Davila RN   "

## 2020-05-12 DIAGNOSIS — E78.5 HYPERLIPIDEMIA WITH TARGET LDL LESS THAN 130: ICD-10-CM

## 2020-05-14 RX ORDER — SIMVASTATIN 20 MG
TABLET ORAL
Qty: 30 TABLET | Refills: 0 | Status: SHIPPED | OUTPATIENT
Start: 2020-05-14 | End: 2020-05-29

## 2020-05-14 NOTE — TELEPHONE ENCOUNTER
Routing refill request to provider for review/approval because:  Labs not current:  LDL  Patient needs to be seen because it has been more than 1 year since last office visit.    LINH BlumN, RN  [facility]

## 2020-06-15 DIAGNOSIS — Z91.030 BEE STING ALLERGY: ICD-10-CM

## 2020-06-16 RX ORDER — EPINEPHRINE 0.3 MG/.3ML
INJECTION SUBCUTANEOUS
Qty: 2 EACH | Refills: 0 | Status: SHIPPED | OUTPATIENT
Start: 2020-06-16

## 2021-01-10 ENCOUNTER — HEALTH MAINTENANCE LETTER (OUTPATIENT)
Age: 61
End: 2021-01-10

## 2021-05-08 ENCOUNTER — HEALTH MAINTENANCE LETTER (OUTPATIENT)
Age: 61
End: 2021-05-08

## 2021-10-23 ENCOUNTER — HEALTH MAINTENANCE LETTER (OUTPATIENT)
Age: 61
End: 2021-10-23

## 2022-06-04 ENCOUNTER — HEALTH MAINTENANCE LETTER (OUTPATIENT)
Age: 62
End: 2022-06-04

## 2022-10-09 ENCOUNTER — HEALTH MAINTENANCE LETTER (OUTPATIENT)
Age: 62
End: 2022-10-09

## 2023-06-10 ENCOUNTER — HEALTH MAINTENANCE LETTER (OUTPATIENT)
Age: 63
End: 2023-06-10

## 2024-04-25 SDOH — HEALTH STABILITY: PHYSICAL HEALTH: ON AVERAGE, HOW MANY MINUTES DO YOU ENGAGE IN EXERCISE AT THIS LEVEL?: 60 MIN

## 2024-04-25 SDOH — HEALTH STABILITY: PHYSICAL HEALTH: ON AVERAGE, HOW MANY DAYS PER WEEK DO YOU ENGAGE IN MODERATE TO STRENUOUS EXERCISE (LIKE A BRISK WALK)?: 4 DAYS

## 2024-04-25 ASSESSMENT — SOCIAL DETERMINANTS OF HEALTH (SDOH): HOW OFTEN DO YOU GET TOGETHER WITH FRIENDS OR RELATIVES?: MORE THAN THREE TIMES A WEEK

## 2024-04-26 ENCOUNTER — OFFICE VISIT (OUTPATIENT)
Dept: FAMILY MEDICINE | Facility: CLINIC | Age: 64
End: 2024-04-26
Payer: COMMERCIAL

## 2024-04-26 VITALS
TEMPERATURE: 97.3 F | OXYGEN SATURATION: 99 % | HEIGHT: 63 IN | WEIGHT: 171 LBS | DIASTOLIC BLOOD PRESSURE: 84 MMHG | RESPIRATION RATE: 16 BRPM | HEART RATE: 70 BPM | BODY MASS INDEX: 30.3 KG/M2 | SYSTOLIC BLOOD PRESSURE: 136 MMHG

## 2024-04-26 DIAGNOSIS — Z13.1 SCREENING FOR DIABETES MELLITUS: ICD-10-CM

## 2024-04-26 DIAGNOSIS — Z12.11 SCREEN FOR COLON CANCER: ICD-10-CM

## 2024-04-26 DIAGNOSIS — Z12.31 VISIT FOR SCREENING MAMMOGRAM: ICD-10-CM

## 2024-04-26 DIAGNOSIS — Z13.29 SCREENING FOR THYROID DISORDER: ICD-10-CM

## 2024-04-26 DIAGNOSIS — E78.5 HYPERLIPIDEMIA WITH TARGET LDL LESS THAN 130: ICD-10-CM

## 2024-04-26 DIAGNOSIS — E03.9 ACQUIRED HYPOTHYROIDISM: ICD-10-CM

## 2024-04-26 DIAGNOSIS — Z00.00 ROUTINE GENERAL MEDICAL EXAMINATION AT A HEALTH CARE FACILITY: Primary | ICD-10-CM

## 2024-04-26 LAB
ANION GAP SERPL CALCULATED.3IONS-SCNC: 13 MMOL/L (ref 7–15)
BUN SERPL-MCNC: 9.2 MG/DL (ref 8–23)
CALCIUM SERPL-MCNC: 10.2 MG/DL (ref 8.8–10.2)
CHLORIDE SERPL-SCNC: 104 MMOL/L (ref 98–107)
CHOLEST SERPL-MCNC: 269 MG/DL
CREAT SERPL-MCNC: 0.81 MG/DL (ref 0.51–0.95)
DEPRECATED HCO3 PLAS-SCNC: 26 MMOL/L (ref 22–29)
EGFRCR SERPLBLD CKD-EPI 2021: 81 ML/MIN/1.73M2
ERYTHROCYTE [DISTWIDTH] IN BLOOD BY AUTOMATED COUNT: 13 % (ref 10–15)
FASTING STATUS PATIENT QL REPORTED: YES
GLUCOSE SERPL-MCNC: 101 MG/DL (ref 70–99)
HCT VFR BLD AUTO: 41.9 % (ref 35–47)
HDLC SERPL-MCNC: 43 MG/DL
HGB BLD-MCNC: 14 G/DL (ref 11.7–15.7)
LDLC SERPL CALC-MCNC: 170 MG/DL
MCH RBC QN AUTO: 29.2 PG (ref 26.5–33)
MCHC RBC AUTO-ENTMCNC: 33.4 G/DL (ref 31.5–36.5)
MCV RBC AUTO: 88 FL (ref 78–100)
NONHDLC SERPL-MCNC: 226 MG/DL
PLATELET # BLD AUTO: 386 10E3/UL (ref 150–450)
POTASSIUM SERPL-SCNC: 3.7 MMOL/L (ref 3.4–5.3)
RBC # BLD AUTO: 4.79 10E6/UL (ref 3.8–5.2)
SODIUM SERPL-SCNC: 143 MMOL/L (ref 135–145)
T4 FREE SERPL-MCNC: 0.84 NG/DL (ref 0.9–1.7)
TRIGL SERPL-MCNC: 281 MG/DL
TSH SERPL DL<=0.005 MIU/L-ACNC: 5.71 UIU/ML (ref 0.3–4.2)
WBC # BLD AUTO: 7.5 10E3/UL (ref 4–11)

## 2024-04-26 PROCEDURE — 84439 ASSAY OF FREE THYROXINE: CPT | Performed by: PHYSICIAN ASSISTANT

## 2024-04-26 PROCEDURE — 84443 ASSAY THYROID STIM HORMONE: CPT | Performed by: PHYSICIAN ASSISTANT

## 2024-04-26 PROCEDURE — 99386 PREV VISIT NEW AGE 40-64: CPT | Performed by: PHYSICIAN ASSISTANT

## 2024-04-26 PROCEDURE — 85027 COMPLETE CBC AUTOMATED: CPT | Performed by: PHYSICIAN ASSISTANT

## 2024-04-26 PROCEDURE — 80048 BASIC METABOLIC PNL TOTAL CA: CPT | Performed by: PHYSICIAN ASSISTANT

## 2024-04-26 PROCEDURE — 36415 COLL VENOUS BLD VENIPUNCTURE: CPT | Performed by: PHYSICIAN ASSISTANT

## 2024-04-26 PROCEDURE — 80061 LIPID PANEL: CPT | Performed by: PHYSICIAN ASSISTANT

## 2024-04-26 NOTE — PROGRESS NOTES
Preventive Care Visit  [facility]  Eileen Hansen PA-C, Family Medicine  Apr 26, 2024      Sky hawkins is a 63 year old, presenting for the following:  Physical        4/26/2024     8:51 AM   Additional Questions   Roomed by Jacquelyn GUTIERREZ MA        Health Care Directive  Patient does not have a Health Care Directive or Living Will: Discussed advance care planning with patient; information given to patient to review.    HPI    Has not been in since COVID. Overall states she has been doing OK. At times feels anxious and more irritable. Not sure if this is just ongoing since menopause. History of being on thyroid medication and statin.         4/25/2024   General Health   How would you rate your overall physical health? Good   Feel stress (tense, anxious, or unable to sleep) To some extent   (!) STRESS CONCERN      4/25/2024   Nutrition   Three or more servings of calcium each day? (!) NO   Diet: Regular (no restrictions)   How many servings of fruit and vegetables per day? (!) 2-3   How many sweetened beverages each day? 0-1         4/25/2024   Exercise   Days per week of moderate/strenous exercise 4 days   Average minutes spent exercising at this level 60 min         4/25/2024   Social Factors   Frequency of gathering with friends or relatives More than three times a week   Worry food won't last until get money to buy more No   Food not last or not have enough money for food? No   Do you have housing?  Yes   Are you worried about losing your housing? No   Lack of transportation? No   Unable to get utilities (heat,electricity)? No         4/25/2024   Fall Risk   Fallen 2 or more times in the past year? No   Trouble with walking or balance? No          4/25/2024   Dental   Dentist two times every year? Yes         4/25/2024   TB Screening   Were you born outside of the [place]? No         Today's PHQ-2 Score:       4/25/2024     7:53 PM   PHQ-2 ( 1999 [employer])   Q1: Little interest or  pleasure in doing things 0   Q2: Feeling down, depressed or hopeless 0   PHQ-2 Score 0   Q1: Little interest or pleasure in doing things Not at all   Q2: Feeling down, depressed or hopeless Not at all   PHQ-2 Score 0           4/25/2024   Substance Use   Alcohol more than 3/day or more than 7/wk No   Do you use any other substances recreationally? No     Social History     Tobacco Use    Smoking status: Never    Smokeless tobacco: Never   Vaping Use    Vaping status: Never Used   Substance Use Topics    Alcohol use: No    Drug use: No          Mammogram Screening - Mammogram every 1-2 years updated in Health Maintenance based on mutual decision making          4/25/2024   One time HIV Screening   Previous HIV test? No         4/25/2024   STI Screening   New sexual partner(s) since last STI/HIV test? No     History of abnormal Pap smear: Status post benign hysterectomy. Health Maintenance and Surgical History updated.        4/3/2009    12:00 AM 3/5/2008    12:00 AM 4/26/2006    12:00 AM   PAP / HPV   PAP (Historical) NIL  NIL  NIL      ASCVD Risk   The ASCVD Risk score (Ab PEÑA, et al., 2019) failed to calculate for the following reasons:    Cannot find a previous HDL lab    Cannot find a previous total cholesterol lab    The BP treatment status is invalid         Reviewed and updated as needed this visit by Provider                    BP Readings from Last 3 Encounters:   04/26/24 136/84   02/04/19 134/78   11/29/17 130/80    Wt Readings from Last 3 Encounters:   04/26/24 77.6 kg (171 lb)   02/04/19 76.2 kg (168 lb)   11/29/17 76.7 kg (169 lb)                  Patient Active Problem List   Diagnosis    Hyperlipidemia with target LDL less than 130    Advanced directives, counseling/discussion     Past Surgical History:   Procedure Laterality Date    HC LAPAROSCOPY, SURGICAL; CHOLECYSTECTOMY      Cholecystectomy, Laparoscopic    HC REMOVAL OF TONSILS,<13 Y/O  age 6    HYSTERECTOMY, PAP NO LONGER INDICATED   "    HYSTERECTOMY, VAGINAL  09    ZZC APPENDECTOMY  age 12       Social History     Tobacco Use    Smoking status: Never    Smokeless tobacco: Never   Substance Use Topics    Alcohol use: No     Family History   Problem Relation Age of Onset    Arthritis Mother     Hypertension Mother 80    Heart Disease Father 95    Heart Disease Maternal Grandfather          from MI ? at age 76    Diabetes Paternal Grandmother         ?         Current Outpatient Medications   Medication Sig Dispense Refill    EPINEPHrine (ANY BX GENERIC EQUIV) 0.3 MG/0.3ML injection 2-pack INJECT 0.3ML INTRAMUSCULARLY AS NEEDED FOR ANAPHYLAXIS 2 each 0    EPINEPHrine (EPIPEN 2-XIMENA) 0.3 MG/0.3ML injection Inject 0.3 mLs (0.3 mg) into the muscle once as needed for anaphylaxis 1 each 2    MULTIVITAMIN TABS   OR 1 tab daily           Review of Systems  Constitutional, HEENT, cardiovascular, pulmonary, GI, , musculoskeletal, neuro, skin, endocrine and psych systems are negative, except as otherwise noted.     Objective    Exam  BP (!) 152/90   Pulse 70   Temp 97.3  F (36.3  C) (Temporal)   Resp 16   Ht 1.59 m (5' 2.6\")   Wt 77.6 kg (171 lb)   LMP 2009   SpO2 99%   BMI 30.68 kg/m     Estimated body mass index is 30.68 kg/m  as calculated from the following:    Height as of this encounter: 1.59 m (5' 2.6\").    Weight as of this encounter: 77.6 kg (171 lb).    Physical Exam  GENERAL: alert and no distress  EYES: Eyes grossly normal to inspection, PERRL and conjunctivae and sclerae normal  HENT: ear canals and TM's normal, nose and mouth without ulcers or lesions  NECK: no adenopathy, no asymmetry, masses, or scars  RESP: lungs clear to auscultation - no rales, rhonchi or wheezes  CV: regular rate and rhythm, normal S1 S2, no S3 or S4, no murmur, click or rub, no peripheral edema  ABDOMEN: soft, nontender, no hepatosplenomegaly, no masses and bowel sounds normal  MS: no gross musculoskeletal defects noted, no edema  SKIN: no " "suspicious lesions or rashes  NEURO: Normal strength and tone, mentation intact and speech normal  PSYCH: mentation appears normal, affect normal/bright    Assessment & Plan     Routine general medical examination at a health care facility  Vaccinations reviewed and declined at this time.  - CBC with platelets; Future  - CBC with platelets    Hyperlipidemia with target LDL less than 130  - Lipid panel reflex to direct LDL Fasting; Future  - Lipid panel reflex to direct LDL Fasting    Screening for diabetes mellitus  - Basic metabolic panel  (Ca, Cl, CO2, Creat, Gluc, K, Na, BUN); Future  - Basic metabolic panel  (Ca, Cl, CO2, Creat, Gluc, K, Na, BUN)    Screening for thyroid disorder  - TSH with free T4 reflex; Future  - TSH with free T4 reflex  - T4 free    Visit for screening mammogram  - *MA Screening Digital Bilateral; Future    Screen for colon cancer  - Colonoscopy Screening  Referral; Future    Patient has been advised of split billing requirements and indicates understanding: Yes      BMI  Estimated body mass index is 30.68 kg/m  as calculated from the following:    Height as of this encounter: 1.59 m (5' 2.6\").    Weight as of this encounter: 77.6 kg (171 lb).   Weight management plan: Discussed healthy diet and exercise guidelines    Counseling  Appropriate preventive services were discussed with this patient, including applicable screening as appropriate for fall prevention, nutrition, physical activity, Tobacco-use cessation, weight loss and cognition.  Checklist reviewing preventive services available has been given to the patient.  Reviewed patient's diet, addressing concerns and/or questions.         Signed Electronically by: Eileen Hansen PA-C    "

## 2024-04-29 RX ORDER — SIMVASTATIN 20 MG
20 TABLET ORAL AT BEDTIME
Qty: 90 TABLET | Refills: 4 | Status: SHIPPED | OUTPATIENT
Start: 2024-04-29

## 2024-04-29 RX ORDER — LEVOTHYROXINE SODIUM 25 UG/1
25 TABLET ORAL DAILY
Qty: 90 TABLET | Refills: 0 | Status: SHIPPED | OUTPATIENT
Start: 2024-04-29 | End: 2024-07-25

## 2024-05-06 ENCOUNTER — TELEPHONE (OUTPATIENT)
Dept: GASTROENTEROLOGY | Facility: CLINIC | Age: 64
End: 2024-05-06
Payer: COMMERCIAL

## 2024-05-06 NOTE — TELEPHONE ENCOUNTER
"Endoscopy Scheduling Screen    Have you had a positive Covid test in the last 14 days?  No    What is your communication preference for Instructions and/or Bowel Prep?   MyChart    What insurance is in the chart?  Other:  [employer]    Ordering/Referring Provider:     KATHERINE GALE      (If ordering provider performs procedure, schedule with ordering provider unless otherwise instructed. )    BMI: Estimated body mass index is 30.68 kg/m  as calculated from the following:    Height as of 4/26/24: 1.59 m (5' 2.6\").    Weight as of 4/26/24: 77.6 kg (171 lb).     Sedation Ordered  moderate sedation.   If patient BMI > 50 do not schedule in [facility].    If patient BMI > 45 do not schedule at [facility].    Are you taking methadone or Suboxone?  No    Have you had difficulties, pain, or discomfort during past endoscopy procedures?  No    Are you taking any prescription medications for pain 3 or more times per week?   NO, No RN review required.    Do you have a history of malignant hyperthermia?  No    (Females) Are you currently pregnant?        Have you been diagnosed or told you have pulmonary hypertension?   No    Do you have an LVAD?  No    Have you been told you have moderate to severe sleep apnea?  No    Have you been told you have COPD, asthma, or any other lung disease?  No    Do you have any heart conditions?  No     Have you ever had or are you waiting for an organ transplant?  No. Continue scheduling, no site restrictions.    Have you had a stroke or transient ischemic attack (TIA aka \"mini stroke\" in the last 6 months?   No    Have you been diagnosed with or been told you have cirrhosis of the liver?   No    Are you currently on dialysis?   No    Do you need assistance transferring?   No    BMI: Estimated body mass index is 30.68 kg/m  as calculated from the following:    Height as of 4/26/24: 1.59 m (5' 2.6\").    Weight as of 4/26/24: 77.6 kg (171 lb).     Is patients BMI > 40 and scheduling location [facility]?  No    Do you take " an injectable medication for weight loss or diabetes (excluding insulin)?  No    Do you take the medication Naltrexone?  No    Do you take blood thinners?  No       Prep   Are you currently on dialysis or do you have chronic kidney disease?  No    Do you have a diagnosis of diabetes?  No    Do you have a diagnosis of cystic fibrosis (CF)?  No    On a regular basis do you go 3 -5 days between bowel movements?  No    BMI > 40?  No    Preferred Pharmacy:    [facility] - [place], [place] - [address]  [address]  [place] [address]  Phone: [phone] Fax: [phone]    Final Scheduling Details     Procedure scheduled  Colonoscopy    Surgeon:  KAYLA     Date of procedure:  7/3     Pre-OP / PAC:   No - Not required for this site.    Location  [facility] - Per order.    Sedation   MAC/Deep Sedation - Per order.      Patient Reminders:   You will receive a call from a Nurse to review instructions and health history.  This assessment must be completed prior to your procedure.  Failure to complete the Nurse assessment may result in the procedure being cancelled.      On the day of your procedure, please designate an adult(s) who can drive you home stay with you for the next 24 hours. The medicines used in the exam will make you sleepy. You will not be able to drive.      You cannot take public transportation, ride share services, or non-medical taxi service without a responsible caregiver.  Medical transport services are allowed with the requirement that a responsible caregiver will receive you at your destination.  We require that drivers and caregivers are confirmed prior to your procedure.

## 2024-05-07 ENCOUNTER — HOSPITAL ENCOUNTER (OUTPATIENT)
Dept: MAMMOGRAPHY | Facility: CLINIC | Age: 64
Discharge: HOME | End: 2024-05-07
Attending: PHYSICIAN ASSISTANT | Admitting: PHYSICIAN ASSISTANT
Payer: COMMERCIAL

## 2024-05-07 DIAGNOSIS — Z12.31 VISIT FOR SCREENING MAMMOGRAM: ICD-10-CM

## 2024-05-07 PROCEDURE — 77063 BREAST TOMOSYNTHESIS BI: CPT

## 2024-05-24 ENCOUNTER — HOSPITAL ENCOUNTER (OUTPATIENT)
Dept: ULTRASOUND IMAGING | Facility: CLINIC | Age: 64
Discharge: HOME | End: 2024-05-24
Attending: PHYSICIAN ASSISTANT
Payer: COMMERCIAL

## 2024-05-24 DIAGNOSIS — R92.8 ABNORMAL MAMMOGRAM: ICD-10-CM

## 2024-05-24 PROCEDURE — 76642 ULTRASOUND BREAST LIMITED: CPT | Mod: LT

## 2024-07-02 NOTE — H&P
[facility] Anesthesia Pre-op History and Physical    Sandy Burleson MRN# [id number]   Age: 63 year old YOB: 1960      Date of Surgery: 7/3/2024 Location [facility]      Date of Exam 7/3/2024 Facility (In hospital)       Home clinic: [facility]  Primary care provider: Eileen Hansen         Chief Complaint and/or Reason for Procedure:   No chief complaint on file.  Colonoscopy         Active problem list:     Patient Active Problem List    Diagnosis Date Noted    Hyperlipidemia with target LDL less than 130 10/02/2015     Priority: Medium            Medications (include herbals and vitamins):   Any Plavix use in the last 7 days? No     No current facility-administered medications for this encounter.     Current Outpatient Medications   Medication Sig Dispense Refill    EPINEPHrine (ANY BX GENERIC EQUIV) 0.3 MG/0.3ML injection 2-pack INJECT 0.3ML INTRAMUSCULARLY AS NEEDED FOR ANAPHYLAXIS 2 each 0    EPINEPHrine (EPIPEN 2-XIMENA) 0.3 MG/0.3ML injection Inject 0.3 mLs (0.3 mg) into the muscle once as needed for anaphylaxis 1 each 2    levothyroxine (SYNTHROID/LEVOTHROID) 25 MCG tablet Take 1 tablet (25 mcg) by mouth daily 90 tablet 0    MULTIVITAMIN TABS   OR 1 tab daily      simvastatin (ZOCOR) 20 MG tablet Take 1 tablet (20 mg) by mouth at bedtime 90 tablet 4             Allergies:      Allergies   Allergen Reactions    Bee Venom      swells up/respiratory difficulty    Oxycodone-Acetaminophen      Percocet-Itching    Sulfa Antibiotics      rash around ankles     Allergy to Latex? No  Allergy to tape?   No  Intolerances:             Physical Exam:   All vitals have been reviewed  No data found.  No intake/output data recorded.  Lungs:   No increased work of breathing, good air exchange, clear to auscultation bilaterally, no crackles or wheezing     Cardiovascular:   Normal apical impulse, regular rate and rhythm, normal S1 and S2, no S3 or S4, and  no murmur noted             Lab / Radiology Results:            Anesthetic risk and/or ASA classification:       Blayne Mayfield MD

## 2024-07-03 ENCOUNTER — ANESTHESIA EVENT (OUTPATIENT)
Dept: GASTROENTEROLOGY | Facility: CLINIC | Age: 64
End: 2024-07-03
Payer: COMMERCIAL

## 2024-07-03 ENCOUNTER — ANESTHESIA (OUTPATIENT)
Dept: GASTROENTEROLOGY | Facility: CLINIC | Age: 64
End: 2024-07-03
Payer: COMMERCIAL

## 2024-07-03 ENCOUNTER — HOSPITAL ENCOUNTER (OUTPATIENT)
Facility: CLINIC | Age: 64
Discharge: HOME | End: 2024-07-03
Attending: INTERNAL MEDICINE | Admitting: INTERNAL MEDICINE
Payer: COMMERCIAL

## 2024-07-03 LAB — COLONOSCOPY: NORMAL

## 2024-07-03 PROCEDURE — 250N000009 HC RX 250: Performed by: NURSE ANESTHETIST, CERTIFIED REGISTERED

## 2024-07-03 PROCEDURE — 45385 COLONOSCOPY W/LESION REMOVAL: CPT | Performed by: INTERNAL MEDICINE

## 2024-07-03 PROCEDURE — 250N000011 HC RX IP 250 OP 636: Performed by: NURSE ANESTHETIST, CERTIFIED REGISTERED

## 2024-07-03 PROCEDURE — 370N000017 HC ANESTHESIA TECHNICAL FEE, PER MIN: Performed by: INTERNAL MEDICINE

## 2024-07-03 PROCEDURE — 258N000003 HC RX IP 258 OP 636: Performed by: NURSE ANESTHETIST, CERTIFIED REGISTERED

## 2024-07-03 PROCEDURE — 88305 TISSUE EXAM BY PATHOLOGIST: CPT | Mod: 26 | Performed by: PATHOLOGY

## 2024-07-03 PROCEDURE — 88305 TISSUE EXAM BY PATHOLOGIST: CPT | Mod: TC | Performed by: INTERNAL MEDICINE

## 2024-07-03 RX ORDER — PROPOFOL 10 MG/ML
INJECTION, EMULSION INTRAVENOUS PRN
Status: DISCONTINUED | OUTPATIENT
Start: 2024-07-03 | End: 2024-07-03

## 2024-07-03 RX ORDER — SODIUM CHLORIDE, SODIUM LACTATE, POTASSIUM CHLORIDE, CALCIUM CHLORIDE 600; 310; 30; 20 MG/100ML; MG/100ML; MG/100ML; MG/100ML
INJECTION, SOLUTION INTRAVENOUS CONTINUOUS PRN
Status: DISCONTINUED | OUTPATIENT
Start: 2024-07-03 | End: 2024-07-03

## 2024-07-03 RX ORDER — SODIUM CHLORIDE, SODIUM LACTATE, POTASSIUM CHLORIDE, CALCIUM CHLORIDE 600; 310; 30; 20 MG/100ML; MG/100ML; MG/100ML; MG/100ML
INJECTION, SOLUTION INTRAVENOUS CONTINUOUS
Status: DISCONTINUED | OUTPATIENT
Start: 2024-07-03 | End: 2024-07-03 | Stop reason: HOSPADM

## 2024-07-03 RX ORDER — ONDANSETRON 2 MG/ML
4 INJECTION INTRAMUSCULAR; INTRAVENOUS EVERY 30 MIN PRN
Status: DISCONTINUED | OUTPATIENT
Start: 2024-07-03 | End: 2024-07-03 | Stop reason: HOSPADM

## 2024-07-03 RX ORDER — NALOXONE HYDROCHLORIDE 0.4 MG/ML
0.1 INJECTION, SOLUTION INTRAMUSCULAR; INTRAVENOUS; SUBCUTANEOUS
Status: DISCONTINUED | OUTPATIENT
Start: 2024-07-03 | End: 2024-07-03 | Stop reason: HOSPADM

## 2024-07-03 RX ORDER — LIDOCAINE HYDROCHLORIDE 20 MG/ML
INJECTION, SOLUTION INFILTRATION; PERINEURAL PRN
Status: DISCONTINUED | OUTPATIENT
Start: 2024-07-03 | End: 2024-07-03

## 2024-07-03 RX ORDER — DEXAMETHASONE SODIUM PHOSPHATE 10 MG/ML
4 INJECTION, SOLUTION INTRAMUSCULAR; INTRAVENOUS
Status: DISCONTINUED | OUTPATIENT
Start: 2024-07-03 | End: 2024-07-03 | Stop reason: HOSPADM

## 2024-07-03 RX ORDER — ONDANSETRON 4 MG/1
4 TABLET, ORALLY DISINTEGRATING ORAL EVERY 30 MIN PRN
Status: DISCONTINUED | OUTPATIENT
Start: 2024-07-03 | End: 2024-07-03 | Stop reason: HOSPADM

## 2024-07-03 RX ORDER — PROPOFOL 10 MG/ML
INJECTION, EMULSION INTRAVENOUS CONTINUOUS PRN
Status: DISCONTINUED | OUTPATIENT
Start: 2024-07-03 | End: 2024-07-03

## 2024-07-03 RX ADMIN — SODIUM CHLORIDE, POTASSIUM CHLORIDE, SODIUM LACTATE AND CALCIUM CHLORIDE: 600; 310; 30; 20 INJECTION, SOLUTION INTRAVENOUS at 08:11

## 2024-07-03 RX ADMIN — PROPOFOL 150 MCG/KG/MIN: 10 INJECTION, EMULSION INTRAVENOUS at 08:09

## 2024-07-03 RX ADMIN — LIDOCAINE HYDROCHLORIDE 25 MG: 20 INJECTION, SOLUTION INFILTRATION; PERINEURAL at 08:09

## 2024-07-03 RX ADMIN — PROPOFOL 80 MG: 10 INJECTION, EMULSION INTRAVENOUS at 08:09

## 2024-07-03 ASSESSMENT — ACTIVITIES OF DAILY LIVING (ADL)
ADLS_ACUITY_SCORE: 33
ADLS_ACUITY_SCORE: 35
ADLS_ACUITY_SCORE: 35

## 2024-07-03 NOTE — DISCHARGE INSTRUCTIONS
[facility]    Home Care Following Endoscopy          Activity:  You have just undergone an endoscopic procedure usually performed with conscious sedation.  Do not work or operate machinery (including a car) for at least 12 hours.    I encourage you to walk and attempt to pass this air as soon as possible.    Diet:  Return to the diet you were on before your procedure but eat lightly for the first 12-24 hours.  Drink plenty of water.  Resume any regular medications unless otherwise advised by your physician.  Please begin any new medication prescribed as a result of your procedure as directed by your physician.   If you had any biopsy or polyp removed please refrain from aspirin or aspirin products for 2 days.  If on Coumadin please restart as instructed by your physician.   Pain:  You may take Tylenol as needed for pain.  Expected Recovery:  You can expect some mild abdominal fullness and/or discomfort due to the air used to inflate your intestinal tract. It is also normal to have a mild sore throat after upper endoscopy.    Call Your Physician if You Have:  After Colonoscopy:  Worsening persisting abdominal pain which is worse with activity.  Fevers (>101 degrees F), chills or shakes.  Passage of continued blood with bowel movements.     Any questions or concerns about your recovery, please call [phone] or after hours [phone] ([phone]) Nurse Advice Line.    Follow-up Care:  You did have polyps/biopsy tissue sample(s) removed.  The polyps/biopsy tissue sample(s) will be sent to pathology.    You should receive letter in your My Chart from Dr. Mayfield with your results within 1-2 weeks. If you do not participate in My Chart a physical letter will come in the mail in 2-3 weeks.  Please call if you have not received a notification of your results.  If asked to return to clinic please make an appointment 1 week after your procedure.  Call [phone].

## 2024-07-03 NOTE — ANESTHESIA PREPROCEDURE EVALUATION
Anesthesia Pre-Procedure Evaluation    Patient: Sandy Burleson   MRN: [id number] : 1960        Procedure : Procedure(s):  Colonoscopy          Past Medical History:   Diagnosis Date    Acute gastric ulcer without mention of hemorrhage, perforation, or obstruction 4 years ago    no current sx, had endoscopy, takes prilosec    Anemia     SECONDARY TO MENORRHAGIA      Past Surgical History:   Procedure Laterality Date    HC LAPAROSCOPY, SURGICAL; CHOLECYSTECTOMY      Cholecystectomy, Laparoscopic    HC REMOVAL OF TONSILS,<11 Y/O  age 6    HYSTERECTOMY, PAP NO LONGER INDICATED      HYSTERECTOMY, VAGINAL  09    ZZC APPENDECTOMY  age 12      Allergies   Allergen Reactions    Bee Venom      swells up/respiratory difficulty    Oxycodone-Acetaminophen      Percocet-Itching    Sulfa Antibiotics      rash around ankles      Social History     Tobacco Use    Smoking status: Never    Smokeless tobacco: Never   Substance Use Topics    Alcohol use: No      Wt Readings from Last 1 Encounters:   24 77.6 kg (171 lb)        Anesthesia Evaluation            ROS/MED HX  ENT/Pulmonary:  - neg pulmonary ROS     Neurologic:  - neg neurologic ROS     Cardiovascular:  - neg cardiovascular ROS     METS/Exercise Tolerance:     Hematologic:     (+)      anemia,          Musculoskeletal:  - neg musculoskeletal ROS     GI/Hepatic:     (+)        bowel prep, appendicitis, cholecystitis/cholelithiasis,          Renal/Genitourinary:  - neg Renal ROS     Endo:     (+)          thyroid problem, hypothyroidism,           Psychiatric/Substance Use:  - neg psychiatric ROS     Infectious Disease:  - neg infectious disease ROS     Malignancy:  - neg malignancy ROS     Other:  - neg other ROS          Physical Exam    Airway  airway exam normal      Mallampati: II   TM distance: > 3 FB   Neck ROM: full   Mouth opening: > 3 cm    Respiratory Devices and Support         Dental           Cardiovascular   cardiovascular exam normal       Rhythm  "and rate: regular and normal     Pulmonary   pulmonary exam normal        breath sounds clear to auscultation           OUTSIDE LABS:  CBC:   Lab Results   Component Value Date    WBC 7.5 04/26/2024    WBC 8.1 02/04/2019    HGB 14.0 04/26/2024    HGB 13.4 02/04/2019    HCT 41.9 04/26/2024    HCT 40.9 02/04/2019     04/26/2024     02/04/2019     BMP:   Lab Results   Component Value Date     04/26/2024     11/16/2016    POTASSIUM 3.7 04/26/2024    POTASSIUM 3.6 11/16/2016    CHLORIDE 104 04/26/2024    CHLORIDE 106 11/16/2016    CO2 26 04/26/2024    CO2 26 11/16/2016    BUN 9.2 04/26/2024    BUN 10 11/16/2016    CR 0.81 04/26/2024    CR 0.80 11/16/2016     (H) 04/26/2024    GLC 88 02/04/2019     COAGS: No results found for: \"PTT\", \"INR\", \"FIBR\"  POC:   Lab Results   Component Value Date    HCG Negative 06/01/2009     HEPATIC:   Lab Results   Component Value Date    ALBUMIN 4.1 11/16/2016    PROTTOTAL 7.2 11/16/2016    ALT 30 11/16/2016    AST 13 11/16/2016    ALKPHOS 75 11/16/2016    BILITOTAL 0.6 11/16/2016     OTHER:   Lab Results   Component Value Date    GARRETT 10.2 04/26/2024    TSH 5.71 (H) 04/26/2024    T4 0.84 (L) 04/26/2024       Anesthesia Plan    ASA Status:  2    NPO Status:  NPO Appropriate    Anesthesia Type: MAC.     - Reason for MAC: straight local not clinically adequate   Induction: Intravenous, Propofol.   Maintenance: TIVA.        Consents    Anesthesia Plan(s) and associated risks, benefits, and realistic alternatives discussed. Questions answered and patient/representative(s) expressed understanding.     - Discussed:     - Discussed with:  Patient       Use of blood products discussed: No .     Postoperative Care            Comments:    Other Comments: The risks and benefits of anesthesia, and the alternatives where applicable, have been discussed with the patient, and they wish to proceed.              Cameron Valera, APRN CRNA    I have reviewed the pertinent notes " and labs in the chart from the past 30 days and (re)examined the patient.  Any updates or changes from those notes are reflected in this note.

## 2024-07-03 NOTE — LETTER
July 8, 2024      ross Burleson  [address]     [facility] [address]        Dear ,    We are writing to inform you of your test results.    Benign polyp removed. A repeat exam in 5 yrs is suggested.    Resulted Orders   Surgical Pathology Exam   Result Value Ref Range    Case Report       Surgical Pathology Report                         Case: [id number]                                  Authorizing Provider:  Blayne Mayfield MD        Collected:           07/03/2024 08:23 AM          Ordering Location:     [facility]          Received:            07/03/2024 08:44 AM                                 [facility]                                                          Pathologist:           Bk Silva MD                                                          Specimen:    Rectum, Rectal Polyp                                                                       Final Diagnosis       A(1). Colon, Rectum, polyp, polypectomy:  -Hyperplastic polyp  -Negative for dysplasia or malignancy.          Clinical Information       Procedure:  COLONOSCOPY, FLEXIBLE, WITH LESION REMOVAL USING SNARE  Pre-op Diagnosis: Screen for colon cancer [Z12.11]  Post-op Diagnosis: Z12.11 - Screen for colon cancer [ICD-10-CM]      Gross Description       A(1). Rectum, Rectal Polyp:  The specimen is received in formalin, labeled with the patient's name, medical record number and other identifying information and designated  rectal polyp . It consists of 4 tan soft tissue fragments ranging from 0.2-0.8 cm.  The largest fragment is bisected.  Entirely submitted in 1 cassette.   (RY Rothman) 7/3/2024 2:50 PM       Microscopic Description       Microscopic examination was performed.      Performing Labs       The technical component of this testing was completed at [facility].    Stain controls for all stains resulted within this report have  been reviewed and show appropriate reactivity.       Case Images         If you have any questions or concerns, please call the clinic at the number listed above.       Sincerely,      Blayne Mayfield MD

## 2024-07-03 NOTE — ANESTHESIA POSTPROCEDURE EVALUATION
Patient: Sandy Burleson    Procedure: Procedure(s):  COLONOSCOPY, FLEXIBLE, WITH LESION REMOVAL USING SNARE       Anesthesia Type:  MAC    Note:  Disposition: Outpatient   Postop Pain Control: Uneventful            Sign Out: Well controlled pain   PONV: No   Neuro/Psych: Uneventful            Sign Out: Acceptable/Baseline neuro status   Airway/Respiratory: Uneventful            Sign Out: Acceptable/Baseline resp. status   CV/Hemodynamics: Uneventful            Sign Out: Acceptable CV status   Other NRE: NONE   DID A NON-ROUTINE EVENT OCCUR? No    Event details/Postop Comments:  Pt was happy with anesthesia care.  No complications.  I will follow up with the pt if needed.           Last vitals:  Vitals Value Taken Time   /78 07/03/24 0845   Temp     Pulse 77 07/03/24 0845   Resp     SpO2 97 % 07/03/24 0852   Vitals shown include unfiled device data.    Electronically Signed By: GLADYS Evans CRNA  July 3, 2024  9:13 AM

## 2024-07-03 NOTE — ANESTHESIA CARE TRANSFER NOTE
Patient: Sandy Burleson    Procedure: Procedure(s):  COLONOSCOPY, FLEXIBLE, WITH LESION REMOVAL USING SNARE       Diagnosis: Screen for colon cancer [Z12.11]  Diagnosis Additional Information: No value filed.    Anesthesia Type:   MAC     Note:    Oropharynx: oropharynx clear of all foreign objects and spontaneously breathing  Level of Consciousness: awake  Oxygen Supplementation: face mask    Independent Airway: airway patency satisfactory and stable  Dentition: dentition unchanged  Vital Signs Stable: post-procedure vital signs reviewed and stable  Report to RN Given: handoff report given  Patient transferred to: Phase II    Handoff Report: Identifed the Patient, Identified the Reponsible Provider, Reviewed the pertinent medical history, Discussed the surgical course, Reviewed Intra-OP anesthesia mangement and issues during anesthesia, Set expectations for post-procedure period and Allowed opportunity for questions and acknowledgement of understanding      Vitals:  Vitals Value Taken Time   BP     Temp     Pulse     Resp     SpO2         Electronically Signed By: GLADYS Evans CRNA  July 3, 2024  8:29 AM

## 2024-07-05 LAB
PATH REPORT.COMMENTS IMP SPEC: NORMAL
PATH REPORT.COMMENTS IMP SPEC: NORMAL
PATH REPORT.FINAL DX SPEC: NORMAL
PATH REPORT.GROSS SPEC: NORMAL
PATH REPORT.MICROSCOPIC SPEC OTHER STN: NORMAL
PATH REPORT.RELEVANT HX SPEC: NORMAL
PHOTO IMAGE: NORMAL

## 2024-07-25 DIAGNOSIS — E03.9 ACQUIRED HYPOTHYROIDISM: ICD-10-CM

## 2024-07-25 RX ORDER — LEVOTHYROXINE SODIUM 25 UG/1
25 TABLET ORAL DAILY
Qty: 90 TABLET | Refills: 0 | Status: SHIPPED | OUTPATIENT
Start: 2024-07-25

## 2024-07-25 NOTE — TELEPHONE ENCOUNTER
Patient has been notified of the note below via [employer]. Reminder set for 3 days to send letter if not read.

## 2024-07-26 ENCOUNTER — LAB (OUTPATIENT)
Dept: LAB | Facility: CLINIC | Age: 64
End: 2024-07-26
Payer: COMMERCIAL

## 2024-07-26 DIAGNOSIS — E78.5 HYPERLIPIDEMIA WITH TARGET LDL LESS THAN 130: ICD-10-CM

## 2024-07-26 DIAGNOSIS — Z11.4 SCREENING FOR HIV (HUMAN IMMUNODEFICIENCY VIRUS): Primary | ICD-10-CM

## 2024-07-26 DIAGNOSIS — E03.9 ACQUIRED HYPOTHYROIDISM: ICD-10-CM

## 2024-07-26 LAB
CHOLEST SERPL-MCNC: 184 MG/DL
FASTING STATUS PATIENT QL REPORTED: YES
HDLC SERPL-MCNC: 48 MG/DL
LDLC SERPL CALC-MCNC: 93 MG/DL
NONHDLC SERPL-MCNC: 136 MG/DL
TRIGL SERPL-MCNC: 217 MG/DL
TSH SERPL DL<=0.005 MIU/L-ACNC: 2.78 UIU/ML (ref 0.3–4.2)

## 2024-07-26 PROCEDURE — 36415 COLL VENOUS BLD VENIPUNCTURE: CPT

## 2024-07-26 PROCEDURE — 84443 ASSAY THYROID STIM HORMONE: CPT

## 2024-07-26 PROCEDURE — 80061 LIPID PANEL: CPT

## 2024-08-28 DIAGNOSIS — T78.2XXD ANAPHYLAXIS, SUBSEQUENT ENCOUNTER: Primary | ICD-10-CM

## 2024-08-29 RX ORDER — EPINEPHRINE 0.3 MG/.3ML
0.3 INJECTION SUBCUTANEOUS
Qty: 1 EACH | Refills: 2 | Status: SHIPPED | OUTPATIENT
Start: 2024-08-29

## 2024-10-24 DIAGNOSIS — E03.9 ACQUIRED HYPOTHYROIDISM: ICD-10-CM

## 2024-10-24 RX ORDER — LEVOTHYROXINE SODIUM 25 UG/1
25 TABLET ORAL DAILY
Qty: 90 TABLET | Refills: 0 | Status: SHIPPED | OUTPATIENT
Start: 2024-10-24

## 2024-10-29 ENCOUNTER — MYC MEDICAL ADVICE (OUTPATIENT)
Dept: FAMILY MEDICINE | Facility: CLINIC | Age: 64
End: 2024-10-29
Payer: COMMERCIAL

## 2024-10-29 ENCOUNTER — NURSE TRIAGE (OUTPATIENT)
Dept: FAMILY MEDICINE | Facility: CLINIC | Age: 64
End: 2024-10-29
Payer: COMMERCIAL

## 2024-10-29 DIAGNOSIS — E03.9 ACQUIRED HYPOTHYROIDISM: ICD-10-CM

## 2024-10-29 RX ORDER — LEVOTHYROXINE SODIUM 25 UG/1
25 TABLET ORAL DAILY
Qty: 90 TABLET | Refills: 0 | OUTPATIENT
Start: 2024-10-29

## 2024-10-29 NOTE — TELEPHONE ENCOUNTER
Nurse Triage SBAR    Is this a 2nd Level Triage? NO    Situation: Patient reports she has a localized rash to her neck that started as what she believes was an asian beetle bite to the R side of her neck about a month ago. She reports it then spread to the R side of her neck and was very itchy and red. She tried a steroid cream her daughter had and it seemed to be a little better and two weeks later she was again bitten by another asian beetle or bug and she is now having a rash to her chest. She has tried hydrocortisone, Bacitracin and tried an anti-histamine one time.     ross CALLAHAN [facility] (supporting Eileen Hansen PA-C)6 hours ago (10:53 AM)       I've been taking this medication for 3 months now and recently developed a rash around my neck. Don't know if it's related to the medication or what is happening. I was also bit by an asian beetle when this all started. Could it be an allergy to that? It all started about a month ago and seems to be spreading.  I don't know how to send a picture. It's not rashy any more just red, dry, itchy and easily irritated. Help please.       No questionnaires available.                    Background: Started Levothyroxine 3 months ago    Assessment: Patient is not feverish, She denies any signs of infection. She overall feels fine and will try Benadryl today.     Protocol Recommended Disposition:   See in Office Within 3 Days    Recommendation: Patient asking if she could see you to have rash looked at as she is unable to send them via [employer].     Routed to provider    Does the patient meet one of the following criteria for ADS visit consideration? No    ARLET Vásquez, RN        Reason for Disposition   Localized rash present > 7 days    Additional Information   Negative: Sounds like a life-threatening emergency to the triager   Negative: Athlete's Foot suspected (i.e., itchy rash between the toes)   Negative: Insect bite(s) suspected    Negative: Jock Itch suspected (i.e., itchy rash on inner thighs near genital area)   Negative: Localized lump (or swelling) without redness or rash   Negative: MPOX SUSPECTED (e.g., direct skin contact such as sex, recent travel to [place] or [place]) and any SYMPTOMS OF MPOX (e.g., rash, fever, muscle aches, or swollen lymph nodes)   Negative: At risk for Mpox (men-who-have-sex-with-men) and possible exposure (e.g., multiple sex partners in past 21 days) and ANY SYMPTOMS OF MPOX (e.g., rash, fever, muscle aches, or swollen lymph nodes)   Negative: Poison ivy, oak, or sumac rash suspected (e.g., itchy rash after contact with poison ivy)   Negative: Rash of female genital area (e.g., labia, vagina, vulva)   Negative: Rash of male genital area (e.g., penis, scrotum)   Negative: Redness of immunization site   Negative: Shingles suspected (i.e., painful rash, multiple small blisters grouped together in one area of body; dermatomal distribution)   Negative: Small spot, skin growth, or mole   Negative: Wound infection suspected (i.e., pain, spreading redness, or pus; in a cut, puncture, scrape or sutured wound)   Negative: Fever and localized purple or blood-colored spots or dots that are not from injury or friction   Negative: Fever and localized rash is very painful   Negative: Patient sounds very sick or weak to the triager   Negative: Looks like a boil, infected sore, deep ulcer, or other infected rash (spreading redness, pus)   Negative: Painful rash with multiple small blisters grouped together (i.e., dermatomal distribution or 'band' or 'stripe')   Negative: Localized rash is very painful (no fever)   Negative: Localized purple or blood-colored spots or dots that are not from injury or friction (no fever)   Negative: Lyme disease suspected (e.g., bull's-eye rash or tick bite / exposure)   Negative: Patient wants to be seen   Negative: Tender bumps in armpits   Negative: Pimples (localized) and no improvement  after using CARE ADVICE   Negative: SEVERE local itching persists after 2 days of steroid cream   Negative: Applying cream or ointment and it causes severe itch, burning, or pain   Negative: Medication patch causing local rash or itching    Protocols used: Rash or Redness - [id number]

## 2024-10-30 NOTE — TELEPHONE ENCOUNTER
Please see if patient would be available to come at 11:20 AM arrival time today.     Eileen Hansen PA-C

## 2024-10-30 NOTE — TELEPHONE ENCOUNTER
RN Triage    Patient Contact    Attempt # 1    Was call answered?  No.  Left message on voicemail with information to call me back. Lovli message also sent.  Elizabeth Dan RN on 10/30/2024 at 9:37 AM

## 2024-10-31 ENCOUNTER — OFFICE VISIT (OUTPATIENT)
Dept: FAMILY MEDICINE | Facility: CLINIC | Age: 64
End: 2024-10-31
Payer: COMMERCIAL

## 2024-10-31 VITALS
SYSTOLIC BLOOD PRESSURE: 136 MMHG | HEART RATE: 76 BPM | OXYGEN SATURATION: 100 % | BODY MASS INDEX: 30.39 KG/M2 | DIASTOLIC BLOOD PRESSURE: 78 MMHG | WEIGHT: 169.4 LBS | RESPIRATION RATE: 20 BRPM

## 2024-10-31 DIAGNOSIS — R21 RASH: Primary | ICD-10-CM

## 2024-10-31 PROCEDURE — 99213 OFFICE O/P EST LOW 20 MIN: CPT | Performed by: PHYSICIAN ASSISTANT

## 2024-10-31 RX ORDER — PREDNISONE 20 MG/1
TABLET ORAL
Qty: 20 TABLET | Refills: 0 | Status: SHIPPED | OUTPATIENT
Start: 2024-10-31

## 2024-10-31 RX ORDER — TRIAMCINOLONE ACETONIDE 1 MG/G
CREAM TOPICAL 2 TIMES DAILY
Qty: 45 G | Refills: 2 | Status: SHIPPED | OUTPATIENT
Start: 2024-10-31

## 2024-10-31 ASSESSMENT — PAIN SCALES - GENERAL: PAINLEVEL_OUTOF10: NO PAIN (0)

## 2024-10-31 NOTE — PROGRESS NOTES
Sky hawkins is a 64 year old, presenting for the following health issues:  Derm Problem        10/31/2024    10:46 AM   Additional Questions   Roomed by Dez CARLSON     History of Present Illness       Reason for visit:  Rash around my neck  Symptom onset:  More than a month  Symptoms include:  Dry, itchy, irritated skin  Symptom intensity:  Moderate  Symptom progression:  Worsening  Had these symptoms before:  No  What makes it worse:  Trying ointments and creams on it  What makes it better:  Not really   She is taking medications regularly.     Rash started a month ago. Was first on her neck after being bit by an Asian beetle. She was bit again on the left side and this triggered a rash as well. Since it has moved to patches on her face. Itchy. Sometimes burns. No other symptoms. No history of eczema.       Review of Systems  Constitutional, neuro, ENT, endocrine, pulmonary, cardiac, gastrointestinal, genitourinary, musculoskeletal, integument and psychiatric systems are negative, except as otherwise noted.      Objective    /78   Pulse 76   Resp 20   Wt 76.8 kg (169 lb 6.4 oz)   LMP 04/14/2009   SpO2 100%   BMI 30.39 kg/m    Body mass index is 30.39 kg/m .  Physical Exam   GENERAL: alert and no distress  SKIN: several flat, erythematous scaling lesion on neck and chest. Small patches on chin, eyes lids and eyebrows  PSYCH: mentation appears normal, affect normal/bright        Assessment & Plan     Rash  Due to rash continuing to spread will start both a course of oral Prednisone as well as steroid cream for neck and chest. Encouraged keeping skin well hydrated. Patient will follow-up in clinic if new symptoms develop or current symptoms fail to improve.  - predniSONE (DELTASONE) 20 MG tablet; Take 3 tabs by mouth daily x 3 days, then 2 tabs daily x 3 days, then 1 tab daily x 3 days, then 1/2 tab daily x 3 days.  - triamcinolone (KENALOG) 0.1 % external cream; Apply topically 2 times daily.    The  patient indicates understanding of these issues and agrees with the plan.    Signed Electronically by: Eileen Hansen PA-C

## 2025-01-20 ENCOUNTER — OFFICE VISIT (OUTPATIENT)
Dept: FAMILY MEDICINE | Facility: CLINIC | Age: 65
End: 2025-01-20
Payer: COMMERCIAL

## 2025-01-20 ENCOUNTER — HOSPITAL ENCOUNTER (OUTPATIENT)
Dept: GENERAL RADIOLOGY | Facility: CLINIC | Age: 65
Discharge: HOME | End: 2025-01-20
Payer: COMMERCIAL

## 2025-01-20 VITALS
WEIGHT: 169 LBS | RESPIRATION RATE: 20 BRPM | OXYGEN SATURATION: 100 % | BODY MASS INDEX: 29.95 KG/M2 | HEIGHT: 63 IN | HEART RATE: 92 BPM | DIASTOLIC BLOOD PRESSURE: 70 MMHG | TEMPERATURE: 100.4 F | SYSTOLIC BLOOD PRESSURE: 130 MMHG

## 2025-01-20 DIAGNOSIS — R05.1 ACUTE COUGH: ICD-10-CM

## 2025-01-20 DIAGNOSIS — J10.1 INFLUENZA A: ICD-10-CM

## 2025-01-20 DIAGNOSIS — R19.7 DIARRHEA, UNSPECIFIED TYPE: ICD-10-CM

## 2025-01-20 DIAGNOSIS — R52 BODY ACHES: ICD-10-CM

## 2025-01-20 DIAGNOSIS — R11.0 NAUSEA: ICD-10-CM

## 2025-01-20 DIAGNOSIS — R05.1 ACUTE COUGH: Primary | ICD-10-CM

## 2025-01-20 LAB
FLUAV RNA SPEC QL NAA+PROBE: POSITIVE
FLUBV RNA RESP QL NAA+PROBE: NEGATIVE
RSV RNA SPEC NAA+PROBE: NEGATIVE
SARS-COV-2 RNA RESP QL NAA+PROBE: NEGATIVE

## 2025-01-20 PROCEDURE — 87637 SARSCOV2&INF A&B&RSV AMP PRB: CPT

## 2025-01-20 PROCEDURE — 71046 X-RAY EXAM CHEST 2 VIEWS: CPT

## 2025-01-20 PROCEDURE — 99213 OFFICE O/P EST LOW 20 MIN: CPT

## 2025-01-20 RX ORDER — ONDANSETRON 4 MG/1
4 TABLET, ORALLY DISINTEGRATING ORAL EVERY 8 HOURS PRN
Qty: 20 TABLET | Refills: 0 | Status: SHIPPED | OUTPATIENT
Start: 2025-01-20

## 2025-01-20 RX ORDER — BENZONATATE 200 MG/1
200 CAPSULE ORAL 3 TIMES DAILY PRN
Qty: 30 CAPSULE | Refills: 0 | Status: SHIPPED | OUTPATIENT
Start: 2025-01-20 | End: 2025-01-30

## 2025-01-20 RX ORDER — OSELTAMIVIR PHOSPHATE 75 MG/1
75 CAPSULE ORAL 2 TIMES DAILY
Qty: 10 CAPSULE | Refills: 0 | Status: SHIPPED | OUTPATIENT
Start: 2025-01-20 | End: 2025-01-25

## 2025-01-20 ASSESSMENT — ENCOUNTER SYMPTOMS: COUGH: 1

## 2025-01-20 ASSESSMENT — PAIN SCALES - GENERAL: PAINLEVEL_OUTOF10: MILD PAIN (3)

## 2025-01-20 NOTE — RESULT ENCOUNTER NOTE
Hello -    Here are my comments about the recent results.  Your test for influenza A is positive.  I will send Tamiflu to your pharmacy for treatment.  Side effects may include upset stomach, nausea, diarrhea, and other side effects.  Please let us know if you have any questions or concerns.    Regards,  Brenna Patel PA-C

## 2025-01-20 NOTE — PATIENT INSTRUCTIONS
ASSESSMENT    - Suspected pneumonia, pending confirmation via chest X-ray.    - Possible viral etiology, including COVID-19, influenza, or RSV, pending swab results.    PLAN    - Perform a chest X-ray to evaluate for possible pneumonia or other underlying conditions contributing to symptoms.  - Conduct a combo swab to test for COVID, influenza, and RSV to determine if symptoms are due to a viral infection.  - Prescribe Tessalon for cough relief, to be taken three times daily as needed for 10 days.  - Prescribe Zofran for nausea, provided as a dissolvable film to be placed under the tongue.  - Await chest X-ray and swab results to guide further treatment. If findings suggest pneumonia, initiate antibiotic therapy. If results indicate a viral infection, initiate antiviral therapy (e.g., Paxlovid for COVID or Tamiflu for influenza).  - Communicate test results and next steps via [facility] once available.    Treating a cough depends on its underlying cause. Here are some general approaches to alleviate cough symptoms:    Stay Hydrated: Drink plenty of fluids, such as water, herbal tea, or clear broths. Staying hydrated helps thin mucus and soothe the throat.    Humidify the Air: Use a cool mist humidifier or take a steamy shower to add moisture to the air. This can help relieve dryness and irritation in the throat and airways.    Cough Drops or Hard Candy: Sucking on throat lozenges or hard candy can increase saliva production, which helps soothe an irritated throat and reduce the urge to cough.    Honey: Honey has natural soothing properties and can help relieve cough symptoms, especially in children over the age of one year. Mix honey with warm water or tea, or simply consume a spoonful as needed.    Over-the-Counter Cough Medications: Depending on the type of cough (dry or productive), over-the-counter cough suppressants (e.g., dextromethorphan) or expectorants (e.g., guaifenesin) may provide relief. Follow dosage  instructions carefully.    Elevate Your Head: Prop yourself up with pillows while sleeping to reduce coughing at night, especially if the cough is due to post-nasal drip.    Avoid Irritants: Avoid exposure to smoke, strong odors, and other airborne irritants that can trigger or worsen cough symptoms.    Steam Inhalation: Inhaling steam from a bowl of hot water or using a steam inhaler can help loosen mucus and relieve congestion in the airways.    Rest and Relaxation: Getting adequate rest and reducing stress can support the body's immune system and help speed up recovery from coughing due to viral infections.    Consult a Healthcare Provider: If your cough persists for more than a few weeks, is severe, or is accompanied by other concerning symptoms such as fever, chest pain, or difficulty breathing, it's important to seek medical evaluation. These could be signs of a more serious condition that requires medical attention.    Always consult with a healthcare provider before giving cough medications to children, especially younger children, to ensure safe and appropriate use. They can also help determine the underlying cause of your cough and recommend specific treatments tailored to your condition.

## 2025-01-20 NOTE — PROGRESS NOTES
"  Assessment & Plan     Acute cough  - XR Chest 2 Views; Future  - Influenza A/B, RSV and SARS-CoV2 PCR (COVID-19); Future  - benzonatate (TESSALON) 200 MG capsule; Take 1 capsule (200 mg) by mouth 3 times daily as needed for cough.  - ondansetron (ZOFRAN ODT) 4 MG ODT tab; Take 1 tablet (4 mg) by mouth every 8 hours as needed for nausea.  - Influenza A/B, RSV and SARS-CoV2 PCR (COVID-19) Nasopharyngeal    Body aches    Diarrhea, unspecified type    Nausea      No LOS data to display   Time spent by me today doing chart review, history and exam, documentation and further activities per the note      BMI  Estimated body mass index is 29.94 kg/m  as calculated from the following:    Height as of this encounter: 1.6 m (5' 2.99\").    Weight as of this encounter: 76.7 kg (169 lb).         See Patient Instructions  Patient Instructions   ASSESSMENT    - Suspected pneumonia, pending confirmation via chest X-ray.    - Possible viral etiology, including COVID-19, influenza, or RSV, pending swab results.    PLAN    - Perform a chest X-ray to evaluate for possible pneumonia or other underlying conditions contributing to symptoms.  - Conduct a combo swab to test for COVID, influenza, and RSV to determine if symptoms are due to a viral infection.  - Prescribe Tessalon for cough relief, to be taken three times daily as needed for 10 days.  - Prescribe Zofran for nausea, provided as a dissolvable film to be placed under the tongue.  - Await chest X-ray and swab results to guide further treatment. If findings suggest pneumonia, initiate antibiotic therapy. If results indicate a viral infection, initiate antiviral therapy (e.g., Paxlovid for COVID or Tamiflu for influenza).  - Communicate test results and next steps via [facility] once available.    Treating a cough depends on its underlying cause. Here are some general approaches to alleviate cough symptoms:    Stay Hydrated: Drink plenty of fluids, such as water, herbal tea, or clear " broths. Staying hydrated helps thin mucus and soothe the throat.    Humidify the Air: Use a cool mist humidifier or take a steamy shower to add moisture to the air. This can help relieve dryness and irritation in the throat and airways.    Cough Drops or Hard Candy: Sucking on throat lozenges or hard candy can increase saliva production, which helps soothe an irritated throat and reduce the urge to cough.    Honey: Honey has natural soothing properties and can help relieve cough symptoms, especially in children over the age of one year. Mix honey with warm water or tea, or simply consume a spoonful as needed.    Over-the-Counter Cough Medications: Depending on the type of cough (dry or productive), over-the-counter cough suppressants (e.g., dextromethorphan) or expectorants (e.g., guaifenesin) may provide relief. Follow dosage instructions carefully.    Elevate Your Head: Prop yourself up with pillows while sleeping to reduce coughing at night, especially if the cough is due to post-nasal drip.    Avoid Irritants: Avoid exposure to smoke, strong odors, and other airborne irritants that can trigger or worsen cough symptoms.    Steam Inhalation: Inhaling steam from a bowl of hot water or using a steam inhaler can help loosen mucus and relieve congestion in the airways.    Rest and Relaxation: Getting adequate rest and reducing stress can support the body's immune system and help speed up recovery from coughing due to viral infections.    Consult a Healthcare Provider: If your cough persists for more than a few weeks, is severe, or is accompanied by other concerning symptoms such as fever, chest pain, or difficulty breathing, it's important to seek medical evaluation. These could be signs of a more serious condition that requires medical attention.    Always consult with a healthcare provider before giving cough medications to children, especially younger children, to ensure safe and appropriate use. They can also help  "determine the underlying cause of your cough and recommend specific treatments tailored to your condition.      Subjective   ross is a 64 year old, presenting for the following health issues:  Cough      1/20/2025     6:52 AM   Additional Questions   Roomed by Alma Weldon    History of Present Illness       Reason for visit:  Coughing since Christmas. Fever of 102 with body aches. worried about pneumonia.  Symptom onset:  1-2 weeks ago  Symptom progression:  Worsening   She is taking medications regularly.     Cough tsrated around christmas, body aches, fever, diarrhea last few days. Nasuea, with no vomting, fatigue.   SUBJECTIVE    The patient  is a 64-year-old female presenting with a persistent cough that began around Christmas 2024. She reports that while others around her had similar coughs that resolved, hers has not improved. On January 19, 2025, she developed fever, body aches, and chills, with a recorded fever of 102 F. She also began experiencing nausea on the evening of January 19, 2025, which persisted into the morning of January 20, 2025. Additionally, she has had diarrhea for the past couple of days but has not vomited, despite feeling like she might.    She describes her cough as mostly dry, though she occasionally feels phlegm in her chest. On January 17, 2025, she began noticing pain between her shoulder blades and became concerned it might indicate pneumonia. She denies any shortness of breath and has no history of pneumonia. She has never smoked and generally considers herself a healthy and energetic person, maintaining her home and walking regularly.    She reports occasional head pressure that she describes as being located further back in her head rather than in her sinuses. She denies sinus pressure but has been experiencing headaches. She does not report significant nasal congestion or a stuffy nose, though she occasionally feels her head is \"not right.\"    She has not received a " "pneumonia vaccine this year and has not had a COVID-19 or flu vaccine. She is not aware of any allergies to antibiotics.  OBJECTIVE    Test results:      - Combo swab for COVID, influenza, and RSV ordered.      - Chest X-ray ordered.                        Objective    /70   Pulse 92   Temp 100.4  F (38  C) (Temporal)   Resp 20   Ht 1.6 m (5' 2.99\")   Wt 76.7 kg (169 lb)   LMP 04/14/2009   SpO2 100%   BMI 29.94 kg/m    Body mass index is 29.94 kg/m .  Physical Exam   GENERAL: alert and no distress  EYES: Eyes grossly normal to inspection, PERRL and conjunctivae and sclerae normal  HENT: ear canals and TM's normal, nose and mouth without ulcers or lesions  NECK: no adenopathy, no asymmetry, masses, or scars  RESP: dry cough. lungs clear to auscultation - no rales, rhonchi or wheezes  CV: regular rate and rhythm, normal S1 S2, no S3 or S4, no murmur, click or rub, no peripheral edema  MS: no gross musculoskeletal defects noted, no edema  SKIN: no suspicious lesions or rashes  NEURO: Normal strength and tone, mentation intact and speech normal  PSYCH: mentation appears normal, affect normal/bright    Lab on 07/26/2024   Component Date Value Ref Range Status    TSH 07/26/2024 2.78  0.30 - 4.20 uIU/mL Final    Cholesterol 07/26/2024 184  <200 mg/dL Final    Triglycerides 07/26/2024 217 (H)  <150 mg/dL Final    Direct Measure HDL 07/26/2024 48 (L)  >=50 mg/dL Final    LDL Cholesterol Calculated 07/26/2024 93  <=100 mg/dL Final    Non HDL Cholesterol 07/26/2024 136 (H)  <130 mg/dL Final    Patient Fasting > 8hrs? 07/26/2024 Yes   Final     No results found for any visits on 01/20/25.  No results found.        Signed Electronically by: Brenna Patel PA-C    "

## 2025-01-23 DIAGNOSIS — E03.9 ACQUIRED HYPOTHYROIDISM: ICD-10-CM

## 2025-01-23 RX ORDER — LEVOTHYROXINE SODIUM 25 UG/1
25 TABLET ORAL DAILY
Qty: 90 TABLET | Refills: 0 | Status: SHIPPED | OUTPATIENT
Start: 2025-01-23

## 2025-04-01 ENCOUNTER — PATIENT OUTREACH (OUTPATIENT)
Dept: CARE COORDINATION | Facility: CLINIC | Age: 65
End: 2025-04-01
Payer: COMMERCIAL

## 2025-04-15 ENCOUNTER — PATIENT OUTREACH (OUTPATIENT)
Dept: CARE COORDINATION | Facility: CLINIC | Age: 65
End: 2025-04-15
Payer: COMMERCIAL

## 2025-04-22 DIAGNOSIS — E03.9 ACQUIRED HYPOTHYROIDISM: ICD-10-CM

## 2025-04-22 RX ORDER — LEVOTHYROXINE SODIUM 25 UG/1
25 TABLET ORAL DAILY
Qty: 90 TABLET | Refills: 0 | Status: SHIPPED | OUTPATIENT
Start: 2025-04-22

## 2025-06-14 ENCOUNTER — HEALTH MAINTENANCE LETTER (OUTPATIENT)
Age: 65
End: 2025-06-14

## 2025-07-20 DIAGNOSIS — E03.9 ACQUIRED HYPOTHYROIDISM: ICD-10-CM

## 2025-07-21 RX ORDER — LEVOTHYROXINE SODIUM 25 UG/1
25 TABLET ORAL DAILY
Qty: 90 TABLET | Refills: 0 | Status: SHIPPED | OUTPATIENT
Start: 2025-07-21

## (undated) DEVICE — KIT ENDO TURNOVER/PROCEDURE CARRY-ON 101822

## (undated) DEVICE — SNARE CAPIVATOR ROUND COLD SNR BX10 M00561101

## (undated) DEVICE — SOL WATER IRRIG 1000ML BOTTLE 2F7114

## (undated) DEVICE — TUBING SUCTION 6"X3/16" N56A

## (undated) DEVICE — SUCTION MANIFOLD NEPTUNE 2 SYS 1 PORT 702-025-000